# Patient Record
Sex: FEMALE | Race: WHITE | NOT HISPANIC OR LATINO | Employment: FULL TIME | URBAN - METROPOLITAN AREA
[De-identification: names, ages, dates, MRNs, and addresses within clinical notes are randomized per-mention and may not be internally consistent; named-entity substitution may affect disease eponyms.]

---

## 2020-06-04 ENCOUNTER — OFFICE VISIT (OUTPATIENT)
Dept: SURGERY | Facility: CLINIC | Age: 40
End: 2020-06-04

## 2020-06-04 VITALS — BODY MASS INDEX: 44.98 KG/M2 | HEIGHT: 65 IN | WEIGHT: 270 LBS

## 2020-06-04 DIAGNOSIS — K80.20 CALCULUS OF GALLBLADDER WITHOUT CHOLECYSTITIS WITHOUT OBSTRUCTION: Primary | ICD-10-CM

## 2020-06-04 PROCEDURE — 99203 OFFICE O/P NEW LOW 30 MIN: CPT | Performed by: SURGERY

## 2020-06-04 RX ORDER — INFLIXIMAB 100 MG/10ML
INJECTION, POWDER, LYOPHILIZED, FOR SOLUTION INTRAVENOUS
COMMUNITY
Start: 2013-08-21

## 2020-06-04 RX ORDER — PANTOPRAZOLE SODIUM 40 MG/1
1 TABLET, DELAYED RELEASE ORAL DAILY PRN
COMMUNITY
Start: 2020-06-01

## 2020-06-15 NOTE — PROGRESS NOTES
General Surgery  Initial Office Visit    CC: Abdominal pain    HPI: The patient is a pleasant 39 y.o. year-old lady who presents today for evaluation of right upper quadrant abdominal pain that has been occurring intermittently for many years and is mostly only evident with deep palpation.  At rest, the area within her right upper quadrant abdomen is nontender.  She always attributed the pain to her previous diagnosis of Crohn's disease which was first known to her around age 31.  She takes Remicade injections every 6 weeks to control her Crohn's disease.  She recently experienced some heartburn, but states that it is better since she started weight watchers and has lost some weight.  Her gastroenterologist performed a gallbladder ultrasound due to her subjective right upper quadrant tenderness on palpation and identified a multitude of gallstones, the largest of which measured 2.1 cm in diameter.  She was then referred to see me to discuss possible cholecystectomy.    Past Medical History:   Crohn's disease    Past Surgical History:   Colonoscopy (2013 [facility])    Medications:   Remicade subQ injection every 6 weeks    Allergies: No known drug allergies    Family History: Mother with history of metastatic breast cancer; no family history of gastrointestinal cancer, inflammatory bowel disease, or gallbladder pathology that she is aware of    Social History: , works as a teacher for [employer], non-smoker, no regular alcohol use    ROS:   Constitutional: Negative for fevers or chills  HENT: Negative for hearing loss or runny nose  Eyes: Negative for vision changes or scleral icterus  Respiratory: Negative for cough or shortness of breath  Cardiovascular: Negative for chest pain or heart palpitations  Gastrointestinal: Positive for diarrhea and reflux; Negative for abdominal pain, nausea, vomiting, constipation, melena, or hematochezia  Genitourinary: Negative for hematuria or  dysuria  Musculoskeletal: Negative for joint swelling or gait instability  Neurologic: Negative for tremors or seizures  Psychiatric: Negative for suicidal ideations or depression  All other systems reviewed and negative    Physical Exam:  Height: 165 cm  Weight: 122 kg  BMI: 44.9  General: No acute distress, well-nourished & well-developed  HEAD: normocephalic, atraumatic  EYES: normal conjunctiva, sclera anicteric  EARS: grossly normal hearing  NECK: supple, no thyromegaly  CARDIOVASCULAR: regular rate and rhythm  RESPIRATORY: clear to auscultation bilaterally  GASTROINTESTINAL: soft, nontender, non-distended  MUSCULOSKELETAL: normal gait and station. No gross extremity abnormalities  PSYCHIATRIC: oriented x3, normal mood and affect    IMAGING:  RIGHT UPPER QUADRANT ULTRASOUND ([facility] [place], [place] performed 6/1/2020):  IMPRESSION: Multiple large gallstones with positive sonographic Vazquez sign.  The largest gallstone measures up to 2.1 cm.  No evidence of ductal dilatation is identified.  No gallbladder wall thickening or pericholecystic fluid evident.    ASSESSMENT & PLAN  Ms. Pickard is a 39-year-old lady with symptomatic cholelithiasis, although her symptoms are fairly mild at this time.  I discussed her gallbladder ultrasound results which I reviewed personally today.  These demonstrate a multitude of gallstones but no sign of acute cholecystitis.  I have recommended she consider undergoing laparoscopic cholecystectomy with intraoperative cholangiogram.  I discussed the repercussions of gallstones including acute cholecystitis, choledocholithiasis, cholangitis, and acute gallstone pancreatitis.  For now, she would like to continue losing weight with the weight watchers program and will call me back if and when she decides to pursue surgery.  She knows to of course call sooner if she develops any signs of jaundice, scleral icterus, or severe right upper quadrant abdominal  pain.    Lianne Coyne MD  General and Endoscopic Surgery  [facility]    [address], [address]  [place], [place], [address]  P: [phone]  F: [phone]

## 2020-11-18 ENCOUNTER — TELEMEDICINE (OUTPATIENT)
Dept: FAMILY MEDICINE CLINIC | Facility: TELEHEALTH | Age: 40
End: 2020-11-18

## 2020-11-18 DIAGNOSIS — L02.429 FURUNCLE OF LOWER LEG: Primary | ICD-10-CM

## 2020-11-18 PROCEDURE — 99213 OFFICE O/P EST LOW 20 MIN: CPT | Performed by: NURSE PRACTITIONER

## 2020-11-18 RX ORDER — DOXYCYCLINE 100 MG/1
100 CAPSULE ORAL 2 TIMES DAILY
Qty: 14 CAPSULE | Refills: 0 | Status: SHIPPED | OUTPATIENT
Start: 2020-11-18 | End: 2020-11-25

## 2020-11-18 NOTE — PATIENT INSTRUCTIONS
Contact your dermatologist and schedule an appointment for follow-up as soon as they have availability.  If you develop fever, or the lesions get worse before you can see your doctor, go to urgent care or ED.      Doxycycline tablets or capsules  What is this medicine?  DOXYCYCLINE (dox mery jones) is a tetracycline antibiotic. It kills certain bacteria or stops their growth. It is used to treat many kinds of infections, like dental, skin, respiratory, and urinary tract infections. It also treats acne, Lyme disease, malaria, and certain sexually transmitted infections.  This medicine may be used for other purposes; ask your health care provider or pharmacist if you have questions.  COMMON BRAND NAME(S): Acticlate, Adoxa, Adoxa CK, Adoxa Mack, Adoxa TT, Alodox, Avidoxy, Doxal, LYMEPAK, Mondoxyne NL, Monodox, Morgidox 1x, Morgidox 1x Kit, Morgidox 2x, Morgidox 2x Kit, NutriDox, Ocudox, Okebo, TARGADOX, Vibra-Tabs, Vibramycin  What should I tell my health care provider before I take this medicine?  They need to know if you have any of these conditions:  · liver disease  · long exposure to sunlight like working outdoors  · stomach problems like colitis  · an unusual or allergic reaction to doxycycline, tetracycline antibiotics, other medicines, foods, dyes, or preservatives  · pregnant or trying to get pregnant  · breast-feeding  How should I use this medicine?  Take this medicine by mouth with a full glass of water. Follow the directions on the prescription label. It is best to take this medicine without food, but if it upsets your stomach take it with food. Take your medicine at regular intervals. Do not take your medicine more often than directed. Take all of your medicine as directed even if you think you are better. Do not skip doses or stop your medicine early.  Talk to your pediatrician regarding the use of this medicine in children. While this drug may be prescribed for selected conditions, precautions do  apply.  Overdosage: If you think you have taken too much of this medicine contact a poison control center or emergency room at once.  NOTE: This medicine is only for you. Do not share this medicine with others.  What if I miss a dose?  If you miss a dose, take it as soon as you can. If it is almost time for your next dose, take only that dose. Do not take double or extra doses.  What may interact with this medicine?  · antacids  · barbiturates  · birth control pills  · bismuth subsalicylate  · carbamazepine  · methoxyflurane  · other antibiotics  · phenytoin  · vitamins that contain iron  · warfarin  This list may not describe all possible interactions. Give your health care provider a list of all the medicines, herbs, non-prescription drugs, or dietary supplements you use. Also tell them if you smoke, drink alcohol, or use illegal drugs. Some items may interact with your medicine.  What should I watch for while using this medicine?  Tell your doctor or health care professional if your symptoms do not improve.  Do not treat diarrhea with over the counter products. Contact your doctor if you have diarrhea that lasts more than 2 days or if it is severe and watery.  Do not take this medicine just before going to bed. It may not dissolve properly when you lay down and can cause pain in your throat. Drink plenty of fluids while taking this medicine to also help reduce irritation in your throat.  This medicine can make you more sensitive to the sun. Keep out of the sun. If you cannot avoid being in the sun, wear protective clothing and use sunscreen. Do not use sun lamps or tanning beds/booths.  Birth control pills may not work properly while you are taking this medicine. Talk to your doctor about using an extra method of birth control.  If you are being treated for a sexually transmitted infection, avoid sexual contact until you have finished your treatment. Your sexual partner may also need treatment.  Avoid antacids,  aluminum, calcium, magnesium, and iron products for 4 hours before and 2 hours after taking a dose of this medicine.  If you are using this medicine to prevent malaria, you should still protect yourself from contact with mosquitos. Stay in screened-in areas, use mosquito nets, keep your body covered, and use an insect repellent.  What side effects may I notice from receiving this medicine?  Side effects that you should report to your doctor or health care professional as soon as possible:  · allergic reactions like skin rash, itching or hives, swelling of the face, lips, or tongue  · difficulty breathing  · fever  · itching in the rectal or genital area  · pain on swallowing  · rash, fever, and swollen lymph nodes  · redness, blistering, peeling or loosening of the skin, including inside the mouth  · severe stomach pain or cramps  · unusual bleeding or bruising  · unusually weak or tired  · yellowing of the eyes or skin  Side effects that usually do not require medical attention (report to your doctor or health care professional if they continue or are bothersome):  · diarrhea  · loss of appetite  · nausea, vomiting  This list may not describe all possible side effects. Call your doctor for medical advice about side effects. You may report side effects to FDA at 6-758-FDA-7684.  Where should I keep my medicine?  Keep out of the reach of children.  Store at room temperature, below 30 degrees C (86 degrees F). Protect from light. Keep container tightly closed. Throw away any unused medicine after the expiration date. Taking this medicine after the expiration date can make you seriously ill.  NOTE: This sheet is a summary. It may not cover all possible information. If you have questions about this medicine, talk to your doctor, pharmacist, or health care provider.  © 2020 Elsevier/Gold Standard (2020-03-19 13:44:53)  Skin Abscess    A skin abscess is an infected area on or under your skin that contains a collection of  pus and other material. An abscess may also be called a furuncle, carbuncle, or boil. An abscess can occur in or on almost any part of your body.  Some abscesses break open (rupture) on their own. Most continue to get worse unless they are treated. The infection can spread deeper into the body and eventually into your blood, which can make you feel ill. Treatment usually involves draining the abscess.  What are the causes?  An abscess occurs when germs, like bacteria, pass through your skin and cause an infection. This may be caused by:  · A scrape or cut on your skin.  · A puncture wound through your skin, including a needle injection or insect bite.  · Blocked oil or sweat glands.  · Blocked and infected hair follicles.  · A cyst that forms beneath your skin (sebaceous cyst) and becomes infected.  What increases the risk?  This condition is more likely to develop in people who:  · Have a weak body defense system (immune system).  · Have diabetes.  · Have dry and irritated skin.  · Get frequent injections or use illegal IV drugs.  · Have a foreign body in a wound, such as a splinter.  · Have problems with their lymph system or veins.  What are the signs or symptoms?  Symptoms of this condition include:  · A painful, firm bump under the skin.  · A bump with pus at the top. This may break through the skin and drain.  Other symptoms include:  · Redness surrounding the abscess site.  · Warmth.  · Swelling of the lymph nodes (glands) near the abscess.  · Tenderness.  · A sore on the skin.  How is this diagnosed?  This condition may be diagnosed based on:  · A physical exam.  · Your medical history.  · A sample of pus. This may be used to find out what is causing the infection.  · Blood tests.  · Imaging tests, such as an ultrasound, CT scan, or MRI.  How is this treated?  A small abscess that drains on its own may not need treatment. Treatment for larger abscesses may include:  · Moist heat or heat pack applied to the  area several times a day.  · A procedure to drain the abscess (incision and drainage).  · Antibiotic medicines. For a severe abscess, you may first get antibiotics through an IV and then change to antibiotics by mouth.  Follow these instructions at home:  Medicines    · Take over-the-counter and prescription medicines only as told by your health care provider.  · If you were prescribed an antibiotic medicine, take it as told by your health care provider. Do not stop taking the antibiotic even if you start to feel better.  Abscess care    · If you have an abscess that has not drained, apply heat to the affected area. Use the heat source that your health care provider recommends, such as a moist heat pack or a heating pad.  ? Place a towel between your skin and the heat source.  ? Leave the heat on for 20-30 minutes.  ? Remove the heat if your skin turns bright red. This is especially important if you are unable to feel pain, heat, or cold. You may have a greater risk of getting burned.  · Follow instructions from your health care provider about how to take care of your abscess. Make sure you:  ? Cover the abscess with a bandage (dressing).  ? Change your dressing or gauze as told by your health care provider.  ? Wash your hands with soap and water before you change the dressing or gauze. If soap and water are not available, use hand .  · Check your abscess every day for signs of a worsening infection. Check for:  ? More redness, swelling, or pain.  ? More fluid or blood.  ? Warmth.  ? More pus or a bad smell.  General instructions  · To avoid spreading the infection:  ? Do not share personal care items, towels, or hot tubs with others.  ? Avoid making skin contact with other people.  · Keep all follow-up visits as told by your health care provider. This is important.  Contact a health care provider if you have:  · More redness, swelling, or pain around your abscess.  · More fluid or blood coming from your  abscess.  · Warm skin around your abscess.  · More pus or a bad smell coming from your abscess.  · A fever.  · Muscle aches.  · Chills or a general ill feeling.  Get help right away if you:  · Have severe pain.  · See red streaks on your skin spreading away from the abscess.  Summary  · A skin abscess is an infected area on or under your skin that contains a collection of pus and other material.  · A small abscess that drains on its own may not need treatment.  · Treatment for larger abscesses may include having a procedure to drain the abscess and taking an antibiotic.  This information is not intended to replace advice given to you by your health care provider. Make sure you discuss any questions you have with your health care provider.  Document Released: 09/27/2006 Document Revised: 04/09/2020 Document Reviewed: 01/31/2019  [employer] Patient Education © 2020 Elsevier Inc.

## 2020-11-18 NOTE — PROGRESS NOTES
Jhonny Pickard is a 40 y.o. female.     The symptoms started over this past weekend with a spot on her left leg and then on 11/16 she noticed a second lesion. The lesions are painful to touch and she also has pain that shoots up her leg. She had a staph infection a few months ago under her left arm. She can not recall any insect bites nor has she spent time outside. She also has hidrodenitis supporativa and has some of those lesions under her arm as well. She takes remacaid injections for crohns and a rhematoid problem. Denies fever. She says she feels ok. The lesions feel warm to touch but not hot. They are not oozing. They are firm and raised.       The following portions of the patient's history were reviewed and updated as appropriate: allergies, current medications, past family history, past medical history, past social history, past surgical history and problem list.    Review of Systems   Constitutional: Positive for fatigue (baseline). Negative for chills, diaphoresis and fever.   Cardiovascular: Negative for leg swelling.   Skin: Positive for skin lesions. Negative for color change.       Objective   Physical Exam  Constitutional:       General: She is not in acute distress.     Appearance: She is well-developed. She is not diaphoretic.   Pulmonary:      Effort: Pulmonary effort is normal.   Skin:     Comments: Right upper calf lesion and mid-calf lesion, both with central white pustule with surrounding redness. Higher lesion was noticed first and is more red and larger pustule center   Neurological:      Mental Status: She is alert and oriented to person, place, and time.   Psychiatric:         Behavior: Behavior normal.           Assessment/Plan   Diagnoses and all orders for this visit:    1. Furuncle of lower leg (Primary)  -     doxycycline (MONODOX) 100 MG capsule; Take 1 capsule by mouth 2 (Two) Times a Day for 7 days.  Dispense: 14 capsule; Refill: 0        Contact your dermatologist and  schedule an appointment for follow-up as soon as they have availability.    I spent 15 minutes in the patient's chart for this video visit.

## 2021-01-21 ENCOUNTER — TELEMEDICINE (OUTPATIENT)
Dept: FAMILY MEDICINE CLINIC | Facility: TELEHEALTH | Age: 41
End: 2021-01-21

## 2021-01-21 DIAGNOSIS — H65.91 FLUID LEVEL BEHIND TYMPANIC MEMBRANE OF RIGHT EAR: ICD-10-CM

## 2021-01-21 DIAGNOSIS — R09.81 NASAL CONGESTION DUE TO PROLONGED USE OF DECONGESTANTS: Primary | ICD-10-CM

## 2021-01-21 DIAGNOSIS — T48.5X5A NASAL CONGESTION DUE TO PROLONGED USE OF DECONGESTANTS: Primary | ICD-10-CM

## 2021-01-21 PROCEDURE — 99213 OFFICE O/P EST LOW 20 MIN: CPT | Performed by: NURSE PRACTITIONER

## 2021-01-21 RX ORDER — FLUTICASONE PROPIONATE 50 MCG
SPRAY, SUSPENSION (ML) NASAL
Qty: 1 BOTTLE | Refills: 1 | Status: SHIPPED | OUTPATIENT
Start: 2021-01-21

## 2021-01-21 NOTE — PROGRESS NOTES
CHIEF COMPLAINT  Chief Complaint   Patient presents with   • Nasal Congestion         HPI  Sandra Pickard is a 40 y.o. female  presents with complaint of nasal congestion for 2 months. Now she has some right ear fullness with Popping and cracking with swallowing. She has no ear pain and no other symptoms.   She reports she has no runny nose or post-nasal drip. She has no history of allergies or allergic rhinitis. She did have a runny nose and mild cold symptoms about 2 months ago when a family member suggested she use an over the counter nasal decongestant. She has a relative that uses this 4-5 times per day for nasal congestion.   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     Review of Systems   Constitutional: Negative for chills, diaphoresis, fatigue and fever.   HENT: Positive for congestion. Negative for ear pain (fullness), postnasal drip, rhinorrhea, sinus pressure, sinus pain, sneezing and sore throat.         Reports no loss of taste, but hard to smell with nasal congestion.    Respiratory: Negative for cough, shortness of breath and wheezing.    Cardiovascular: Negative for chest pain.   Gastrointestinal: Negative for diarrhea, nausea and vomiting.   Neurological: Negative for headaches.   Hematological: Negative for adenopathy.       Past Medical History:   Diagnosis Date   • Crohn's disease (CMS/[facility])        Family History   Problem Relation Age of Onset   • Breast cancer Mother        Social History     Socioeconomic History   • Marital status:      Spouse name: Not on file   • Number of children: Not on file   • Years of education: Not on file   • Highest education level: Not on file   Tobacco Use   • Smoking status: Never Smoker   • Smokeless tobacco: Never Used   Substance and Sexual Activity   • Alcohol use: Never     Frequency: Never   • Drug use: Never   • Sexual activity: Defer         There were no vitals taken  for this visit.    PHYSICAL EXAM  Physical Exam   Constitutional: She is oriented to person, place, and time. She appears well-developed and well-nourished. She does not have a sickly appearance. She does not appear ill. No distress.   HENT:   Head: Normocephalic and atraumatic.   Nose: Mucosal edema present.   Eyes: EOM are normal.   Neck: Neck normal appearance.  Pulmonary/Chest: Effort normal.  No respiratory distress.  Neurological: She is alert and oriented to person, place, and time.   Skin: Skin is dry.   Psychiatric: She has a normal mood and affect.       No results found for this or any previous visit.    Diagnoses and all orders for this visit:    1. Nasal congestion due to prolonged use of decongestants (Primary)    2. Fluid level behind tympanic membrane of right ear    Other orders  -     fluticasone (Flonase) 50 MCG/ACT nasal spray; 1 spray in each nostril bid for 7 days then daily  Dispense: 1 bottle; Refill: 1          FOLLOW-UP  As discussed during visit with PCP/[facility] if no improvement or Urgent Care/Emergency Department if worsening of symptoms    Patient verbalizes understanding of medication dosage, comfort measures, instructions for treatment and follow-up.    JANICE Cheung  01/21/2021  16:37 EST    This visit was performed via Telehealth.  This patient has been instructed to follow-up with their primary care provider if their symptoms worsen or the treatment provided does not resolve their illness.

## 2021-01-21 NOTE — PATIENT INSTRUCTIONS
· Consider intranasal decongestants such as oxymetazoline or phenylephrine for intermittent use, but do not use continuously due to the risk of rebound rhinitis  · Stop the use of nasal decongestant  · This may take several weeks to treat.   · If nasal passages are dry try nasal saline drops or humidifier  · If nasal congestion does not respond to nasal corticosteroid, may need otolaryngology referral (ENT).   · Follow up in 2 weeks if no response to treatment.   · If you develop ear pain follow up as you may need antibiotic treatment.         Nonallergic Rhinitis  Nonallergic rhinitis is a condition that causes symptoms that affect the nose, such as a runny nose and a stuffed-up nose (nasal congestion) that can make it hard to breathe through the nose. This condition is different from having an allergy (allergic rhinitis). Allergic rhinitis occurs when the body's defense system (immune system) reacts to a substance that you are allergic to (allergen), such as pollen, pet dander, mold, or dust. Nonallergic rhinitis has many similar symptoms, but it is not caused by allergens. Nonallergic rhinitis can be a short-term or long-term problem.  What are the causes?  This condition can be caused by many different things. Some common types of nonallergic rhinitis include:  Infectious rhinitis  · This is usually due to an infection in the upper respiratory tract.  Vasomotor rhinitis  · This is the most common type of long-term nonallergic rhinitis.  · It is caused by too much blood flow through the nose, which makes the tissue inside of the nose swell.  · Symptoms are often triggered by strong odors, cold air, stress, drinking alcohol, cigarette smoke, or changes in the weather.  Occupational rhinitis  · This type is caused by triggers in the workplace, such as chemicals, dusts, animal dander, or air pollution.  Hormonal rhinitis  · This type occurs in women as a result of an increase in the female hormone estrogen.  · It  may occur during pregnancy, puberty, and menstrual cycles.  · Symptoms improve when estrogen levels drop.  Drug-induced rhinitis  Several drugs can cause nonallergic rhinitis, including:  · Medicines that are used to treat high blood pressure, heart disease, and Parkinson disease.  · Aspirin and NSAIDs.  · Over-the-counter nasal decongestant sprays. These can cause a type of nonallergic rhinitis (rhinitis medicamentosa) when they are used for more than a few days.  Nonallergic rhinitis with eosinophilia syndrome (NARES)  · This type is caused by having too much of a certain type of white blood cell (eosinophil).  Nonallergic rhinitis can also be caused by a reaction to eating hot or spicy foods. This does not usually cause long-term symptoms. In some cases, the cause of nonallergic rhinitis is not known.  What increases the risk?  You are more likely to develop this condition if:  · You are 30-60 years of age.  · You are a woman. Women are twice as likely to have this condition.  What are the signs or symptoms?  Common symptoms of this condition include:  · Nasal congestion.  · Runny nose.  · The feeling of mucus going down the back of the throat (postnasal drip).  · Trouble sleeping at night and daytime sleepiness.  Less common symptoms include:  · Sneezing.  · Coughing.  · Itchy nose.  · Bloodshot eyes.  How is this diagnosed?  This condition may be diagnosed based on:  · Your symptoms and medical history.  · A physical exam.  · Allergy testing to rule out allergic rhinitis. You may have skin tests or blood tests.  In some cases, the health care provider may take a swab of nasal secretions to look for an increased number of eosinophils. This would be done to confirm a diagnosis of NARES.  How is this treated?  Treatment for this condition depends on the cause. No single treatment works for everyone. Work with your health care provider to find the best treatment for you. Treatment may include:  · Avoiding the  things that trigger your symptoms.  · Using medicines to relieve congestion, such as:  ? Steroid nasal spray. There are many types. You may need to try a few to find out which one works best.  ? Decongestant medicine. This may be an oral medicine or a nasal spray. These medicines are only used for a short time.  · Using medicines to relieve a runny nose. These may include antihistamine medicines or anticholinergic nasal sprays.    Surgery to remove tissue from inside the nose may be needed in severe cases if the condition has not improved after 6-12 months of medical treatment.  Follow these instructions at home:  · Take or use over-the-counter and prescription medicines only as told by your health care provider. Do not stop using your medicine even if you start to feel better.  · Use salt-water (saline) rinses or other solutions (nasal washes or irrigations) to wash or rinse out the inside of your nose as told by your health care provider.  · Do not take NSAIDs or medicines that contain aspirin if they make your symptoms worse.  · Do not drink alcohol if it makes your symptoms worse.  · Do not use any tobacco products, such as cigarettes, chewing tobacco, and e-cigarettes. If you need help quitting, ask your health care provider.  · Avoid secondhand smoke.  · Get some exercise every day. Exercise may help reduce symptoms of nonallergic rhinitis for some people. Ask your health care provider how much exercise and what types of exercise are safe for you.  · Sleep with the head of your bed raised (elevated). This may reduce nighttime nasal congestion.  · Keep all follow-up visits as told by your health care provider. This is important.  Contact a health care provider if:  · You have a fever.  · Your symptoms are getting worse at home.  · Your symptoms are not responding to medicine.  · You develop new symptoms, especially a headache or nosebleed.  This information is not intended to replace advice given to you by your  health care provider. Make sure you discuss any questions you have with your health care provider.  Document Revised: 11/30/2018 Document Reviewed: 03/09/2017  [employer]vier Patient Education © 2020 [employer]

## 2021-02-09 ENCOUNTER — TELEMEDICINE (OUTPATIENT)
Dept: FAMILY MEDICINE CLINIC | Facility: TELEHEALTH | Age: 41
End: 2021-02-09

## 2021-02-09 VITALS — BODY MASS INDEX: 34.16 KG/M2 | TEMPERATURE: 97.9 F | WEIGHT: 205 LBS | HEIGHT: 65 IN

## 2021-02-09 DIAGNOSIS — H66.90 ACUTE OTITIS MEDIA, UNSPECIFIED OTITIS MEDIA TYPE: ICD-10-CM

## 2021-02-09 DIAGNOSIS — H61.21 CERUMEN DEBRIS ON TYMPANIC MEMBRANE OF RIGHT EAR: ICD-10-CM

## 2021-02-09 DIAGNOSIS — L98.9 FACIAL SKIN LESION: Primary | ICD-10-CM

## 2021-02-09 PROBLEM — N63.0 BREAST LUMP: Status: ACTIVE | Noted: 2021-02-09

## 2021-02-09 PROBLEM — D64.9 ABSOLUTE ANEMIA: Status: ACTIVE | Noted: 2021-02-09

## 2021-02-09 PROBLEM — IMO0001 BLUES: Status: ACTIVE | Noted: 2021-02-09

## 2021-02-09 PROBLEM — L40.9 PSORIASIS: Status: ACTIVE | Noted: 2021-02-09

## 2021-02-09 PROBLEM — L30.9 DERMATITIS, ECZEMATOID: Status: ACTIVE | Noted: 2021-02-09

## 2021-02-09 PROBLEM — N39.0 INFECTION OF URINARY TRACT: Status: ACTIVE | Noted: 2021-02-09

## 2021-02-09 PROBLEM — L25.9 CD (CONTACT DERMATITIS): Status: ACTIVE | Noted: 2021-02-09

## 2021-02-09 PROBLEM — K52.9 COLITIS: Status: ACTIVE | Noted: 2021-02-09

## 2021-02-09 PROBLEM — L70.9 ACNE: Status: ACTIVE | Noted: 2021-02-09

## 2021-02-09 PROBLEM — H65.00 ACUTE SEROUS OTITIS MEDIA: Status: ACTIVE | Noted: 2021-02-09

## 2021-02-09 PROBLEM — E55.9 AVITAMINOSIS D: Status: ACTIVE | Noted: 2021-02-09

## 2021-02-09 PROCEDURE — 99212 OFFICE O/P EST SF 10 MIN: CPT | Performed by: NURSE PRACTITIONER

## 2021-02-09 RX ORDER — AMOXICILLIN 875 MG/1
875 TABLET, COATED ORAL 2 TIMES DAILY
Qty: 20 TABLET | Refills: 0 | Status: SHIPPED | OUTPATIENT
Start: 2021-02-09 | End: 2021-02-19

## 2021-02-09 RX ORDER — GUAIFENESIN 600 MG/1
600 TABLET, EXTENDED RELEASE ORAL 2 TIMES DAILY
Qty: 28 TABLET | Refills: 0 | Status: SHIPPED | OUTPATIENT
Start: 2021-02-09 | End: 2021-02-23

## 2021-02-09 NOTE — PROGRESS NOTES
CHIEF COMPLAINT  Cc: ear ache    HPI  Sandra Pickard is a 40 y.o. female  presents with complaint of an ear ache on the right. It is mostly pressure but she does report intermittent daily pain in the ear too. She was seen via telehealth on 01/21/21. At that time she was having popping amd cracking in her right ear when she swallows. She was diagnosed with nasal congestion due to prolonged use of nasal decongestants and fluid behind her right TM. She was advised to stop the nasal decongestant and start flonase which she has done without relief of symptoms. She has some hearing loss on the right too  She uses q tips at times. She is also c/o of a sore on the left near her mouth that will not go away. It is cracked and crusted. She has used neosporin on it with no improvement. She does report a history of a staph infection.    Review of Systems   Constitutional: Negative for fatigue and fever.   HENT: Positive for congestion (improving,yellow in am then clear), ear pain (ear pressure and hurts intermittently on right), hearing loss (right), postnasal drip, rhinorrhea and tinnitus (ocassional, bilateral, she thinks it is from band). Negative for sinus pressure, sinus pain and sore throat.    Respiratory: Negative for cough, shortness of breath and wheezing.    Cardiovascular: Negative for chest pain.   Gastrointestinal: Positive for diarrhea (baseline from crohns). Negative for nausea and vomiting.   Musculoskeletal: Negative for myalgias.   Skin: Negative for rash.        Near corner of mouth cracks and crusty   Neurological: Positive for light-headedness (mild just today, intermittent). Negative for dizziness and headaches.       Past Medical History:   Diagnosis Date   • Crohn's disease ([facility])        Family History   Problem Relation Age of Onset   • Breast cancer Mother        Social History     Socioeconomic History   • Marital status:      Spouse name: Not on file   • Number of children: Not on file   •  "Years of education: Not on file   • Highest education level: Not on file   Tobacco Use   • Smoking status: Never Smoker   • Smokeless tobacco: Never Used   Substance and Sexual Activity   • Alcohol use: Never     Frequency: Never   • Drug use: Never   • Sexual activity: Defer         Temp 97.9 °F (36.6 °C)   Ht 165.1 cm (65\")   Wt 93 kg (205 lb)   Breastfeeding No   BMI 34.11 kg/m²     PHYSICAL EXAM  Physical Exam   Constitutional: She is oriented to person, place, and time. She appears well-developed and well-nourished.   HENT:   Head: Normocephalic and atraumatic.   Right Ear: External ear normal. There is tenderness. Decreased hearing is noted.   Left Ear: Hearing and external ear normal.   Nose: Congestion present.   Nares mildly erythematous   Eyes: Lids are normal. Right eye exhibits no discharge and no exudate. Left eye exhibits no discharge and no exudate. Right conjunctiva is not injected. Left conjunctiva is not injected.   Pulmonary/Chest: No accessory muscle usage. No tachypnea and no bradypnea.  No respiratory distress.No use of oxygen by nasal cannulaNo use of oxygen by mask noted.  Abdominal: Abdomen appears normal.   Lymphadenopathy:        Right cervical: No anterior cervical (patient directed exam) adenopathy present.       Left cervical: No anterior cervical (patient directed exam) adenopathy present.   Neurological: She is alert and oriented to person, place, and time. No cranial nerve deficit.   Skin: Lesion (lateral to corner of mouth lacerated crusting, 1cm in length) noted.   Psychiatric: She has a normal mood and affect. Her speech is normal and behavior is normal. Judgment and thought content normal.       No results found for this or any previous visit.    Diagnoses and all orders for this visit:    1. Facial skin lesion (Primary)    2. Acute otitis media, unspecified otitis media type    3. Cerumen debris on tympanic membrane of right ear    Other orders  -     guaiFENesin (Mucinex) " 600 MG 12 hr tablet; Take 1 tablet by mouth 2 (Two) Times a Day for 14 days.  Dispense: 28 tablet; Refill: 0  -     amoxicillin (AMOXIL) 875 MG tablet; Take 1 tablet by mouth 2 (Two) Times a Day for 10 days.  Dispense: 20 tablet; Refill: 0  -     mupirocin (Bactroban) 2 % ointment; Apply  topically to the appropriate area as directed 2 (Two) Times a Day.  Dispense: 22 g; Refill: 0  -     carbamide peroxide (Debrox) 6.5 % otic solution; Administer 10 drops to the right ear At Night As Needed for Ear Pain. May sub different size bottle if 22 ml unavailable  Dispense: 22 mL; Refill: 0    Probiotics for two weeks related to taking antibiotics. The pharmacist can help you with this if needed.  Mucinex with plenty of fluids especially water to thin secretions and help with congestion.    Wash corner of mouth with warm water and mild soap and them apply mupirocin twice daily.    If no improvement in ear post antibiotics put 10 drops of debrox in right ear with cotton ball for 5 nights  On night 6 flush with warm water and peroxide in bulb syring, may repeat flush    FOLLOW-UP    If symptoms worsen or persist follow up with PCP or Urgent Care where ear can be visualized    Patient verbalizes understanding of medication dosage, comfort measures, instructions for treatment and follow-up.    JANICE Méndez  02/09/2021  16:01 EST    This visit was performed via Telehealth.  This patient has been instructed to follow-up with their primary care provider if their symptoms worsen or the treatment provided does not resolve their illness.

## 2021-02-09 NOTE — PATIENT INSTRUCTIONS
Nonallergic Rhinitis  Nonallergic rhinitis is a condition that causes symptoms that affect the nose, such as a runny nose and a stuffed-up nose (nasal congestion) that can make it hard to breathe through the nose. This condition is different from having an allergy (allergic rhinitis). Allergic rhinitis occurs when the body's defense system (immune system) reacts to a substance that you are allergic to (allergen), such as pollen, pet dander, mold, or dust. Nonallergic rhinitis has many similar symptoms, but it is not caused by allergens. Nonallergic rhinitis can be a short-term or long-term problem.  What are the causes?  This condition can be caused by many different things. Some common types of nonallergic rhinitis include:  Infectious rhinitis  This is usually due to an infection in the upper respiratory tract.  Vasomotor rhinitis  This is the most common type of long-term nonallergic rhinitis.  It is caused by too much blood flow through the nose, which makes the tissue inside of the nose swell.  Symptoms are often triggered by strong odors, cold air, stress, drinking alcohol, cigarette smoke, or changes in the weather.  Occupational rhinitis  This type is caused by triggers in the workplace, such as chemicals, dusts, animal dander, or air pollution.  Hormonal rhinitis  This type occurs in women as a result of an increase in the female hormone estrogen.  It may occur during pregnancy, puberty, and menstrual cycles.  Symptoms improve when estrogen levels drop.  Drug-induced rhinitis  Several drugs can cause nonallergic rhinitis, including:  Medicines that are used to treat high blood pressure, heart disease, and Parkinson disease.  Aspirin and NSAIDs.  Over-the-counter nasal decongestant sprays. These can cause a type of nonallergic rhinitis (rhinitis medicamentosa) when they are used for more than a few days.  Nonallergic rhinitis with eosinophilia syndrome (NARES)  This type is caused by having too much of a  certain type of white blood cell (eosinophil).  Nonallergic rhinitis can also be caused by a reaction to eating hot or spicy foods. This does not usually cause long-term symptoms. In some cases, the cause of nonallergic rhinitis is not known.  What increases the risk?  You are more likely to develop this condition if:  You are 30-60 years of age.  You are a woman. Women are twice as likely to have this condition.  What are the signs or symptoms?  Common symptoms of this condition include:  Nasal congestion.  Runny nose.  The feeling of mucus going down the back of the throat (postnasal drip).  Trouble sleeping at night and daytime sleepiness.  Less common symptoms include:  Sneezing.  Coughing.  Itchy nose.  Bloodshot eyes.  How is this diagnosed?  This condition may be diagnosed based on:  Your symptoms and medical history.  A physical exam.  Allergy testing to rule out allergic rhinitis. You may have skin tests or blood tests.  In some cases, the health care provider may take a swab of nasal secretions to look for an increased number of eosinophils. This would be done to confirm a diagnosis of NARES.  How is this treated?  Treatment for this condition depends on the cause. No single treatment works for everyone. Work with your health care provider to find the best treatment for you. Treatment may include:  Avoiding the things that trigger your symptoms.  Using medicines to relieve congestion, such as:  Steroid nasal spray. There are many types. You may need to try a few to find out which one works best.  Decongestant medicine. This may be an oral medicine or a nasal spray. These medicines are only used for a short time.  Using medicines to relieve a runny nose. These may include antihistamine medicines or anticholinergic nasal sprays.    Surgery to remove tissue from inside the nose may be needed in severe cases if the condition has not improved after 6-12 months of medical treatment.  Follow these instructions at  home:  Take or use over-the-counter and prescription medicines only as told by your health care provider. Do not stop using your medicine even if you start to feel better.  Use salt-water (saline) rinses or other solutions (nasal washes or irrigations) to wash or rinse out the inside of your nose as told by your health care provider.  Do not take NSAIDs or medicines that contain aspirin if they make your symptoms worse.  Do not drink alcohol if it makes your symptoms worse.  Do not use any tobacco products, such as cigarettes, chewing tobacco, and e-cigarettes. If you need help quitting, ask your health care provider.  Avoid secondhand smoke.  Get some exercise every day. Exercise may help reduce symptoms of nonallergic rhinitis for some people. Ask your health care provider how much exercise and what types of exercise are safe for you.  Sleep with the head of your bed raised (elevated). This may reduce nighttime nasal congestion.  Keep all follow-up visits as told by your health care provider. This is important.  Contact a health care provider if:  You have a fever.  Your symptoms are getting worse at home.  Your symptoms are not responding to medicine.  You develop new symptoms, especially a headache or nosebleed.  This information is not intended to replace advice given to you by your health care provider. Make sure you discuss any questions you have with your health care provider.  Document Revised: 11/30/2018 Document Reviewed: 03/09/2017  Elsevier Patient Education © 2020 [employer]  Earwax Buildup, Adult  The ears produce a substance called earwax that helps keep bacteria out of the ear and protects the skin in the ear canal. Occasionally, earwax can build up in the ear and cause discomfort or hearing loss.  What increases the risk?  This condition is more likely to develop in people who:  Are male.  Are elderly.  Naturally produce more earwax.  Clean their ears often with cotton swabs.  Use earplugs  often.  Use in-ear headphones often.  Wear hearing aids.  Have narrow ear canals.  Have earwax that is overly thick or sticky.  Have eczema.  Are dehydrated.  Have excess hair in the ear canal.  What are the signs or symptoms?  Symptoms of this condition include:  Reduced or muffled hearing.  A feeling of fullness in the ear or feeling that the ear is plugged.  Fluid coming from the ear.  Ear pain.  Ear itch.  Ringing in the ear.  Coughing.  An obvious piece of earwax that can be seen inside the ear canal.  How is this diagnosed?  This condition may be diagnosed based on:  Your symptoms.  Your medical history.  An ear exam. During the exam, your health care provider will look into your ear with an instrument called an otoscope.  You may have tests, including a hearing test.  How is this treated?  This condition may be treated by:  Using ear drops to soften the earwax.  Having the earwax removed by a health care provider. The health care provider may:  Flush the ear with water.  Use an instrument that has a loop on the end (curette).  Use a suction device.  Surgery to remove the wax buildup. This may be done in severe cases.  Follow these instructions at home:    Take over-the-counter and prescription medicines only as told by your health care provider.  Do not put any objects, including cotton swabs, into your ear. You can clean the opening of your ear canal with a washcloth or facial tissue.  Follow instructions from your health care provider about cleaning your ears. Do not over-clean your ears.  Drink enough fluid to keep your urine clear or pale yellow. This will help to thin the earwax.  Keep all follow-up visits as told by your health care provider. If earwax builds up in your ears often or if you use hearing aids, consider seeing your health care provider for routine, preventive ear cleanings. Ask your health care provider how often you should schedule your cleanings.  If you have hearing aids, clean them  according to instructions from the  and your health care provider.  Contact a health care provider if:  You have ear pain.  You develop a fever.  You have blood, pus, or other fluid coming from your ear.  You have hearing loss.  You have ringing in your ears that does not go away.  Your symptoms do not improve with treatment.  You feel like the room is spinning (vertigo).  Summary  Earwax can build up in the ear and cause discomfort or hearing loss.  The most common symptoms of this condition include reduced or muffled hearing and a feeling of fullness in the ear or feeling that the ear is plugged.  This condition may be diagnosed based on your symptoms, your medical history, and an ear exam.  This condition may be treated by using ear drops to soften the earwax or by having the earwax removed by a health care provider.  Do not put any objects, including cotton swabs, into your ear. You can clean the opening of your ear canal with a washcloth or facial tissue.  This information is not intended to replace advice given to you by your health care provider. Make sure you discuss any questions you have with your health care provider.  Document Revised: 11/30/2018 Document Reviewed: 02/28/2018  [employer] Patient Education © 2020 Elsevier Inc.  Otitis Media, Adult    Otitis media occurs when there is inflammation and fluid in the middle ear. Your middle ear is a part of the ear that contains bones for hearing as well as air that helps send sounds to your brain.  What are the causes?  This condition is caused by a blockage in the eustachian tube. This tube drains fluid from the ear to the back of the nose (nasopharynx). A blockage in this tube can be caused by an object or by swelling (edema) in the tube. Problems that can cause a blockage include:  · A cold or other upper respiratory infection.  · Allergies.  · An irritant, such as tobacco smoke.  · Enlarged adenoids. The adenoids are areas of soft tissue  located high in the back of the throat, behind the nose and the roof of the mouth.  · A mass in the nasopharynx.  · Damage to the ear caused by pressure changes (barotrauma).  What are the signs or symptoms?  Symptoms of this condition include:  · Ear pain.  · A fever.  · Decreased hearing.  · A headache.  · Tiredness (lethargy).  · Fluid leaking from the ear.  · Ringing in the ear.  How is this diagnosed?  This condition is diagnosed with a physical exam. During the exam your health care provider will use an instrument called an otoscope to look into your ear and check for redness, swelling, and fluid. He or she will also ask about your symptoms.  Your health care provider may also order tests, such as:  · A test to check the movement of the eardrum (pneumatic otoscopy). This test is done by squeezing a small amount of air into the ear.  · A test that changes air pressure in the middle ear to check how well the eardrum moves and whether the eustachian tube is working (tympanogram).  How is this treated?  This condition usually goes away on its own within 3-5 days. But if the condition is caused by a bacteria infection and does not go away own its own, or keeps coming back, your health care provider may:  · Prescribe antibiotic medicines to treat the infection.  · Prescribe or recommend medicines to control pain.  Follow these instructions at home:  · Take over-the-counter and prescription medicines only as told by your health care provider.  · If you were prescribed an antibiotic medicine, take it as told by your health care provider. Do not stop taking the antibiotic even if you start to feel better.  · Keep all follow-up visits as told by your health care provider. This is important.  Contact a health care provider if:  · You have bleeding from your nose.  · There is a lump on your neck.  · You are not getting better in 5 days.  · You feel worse instead of better.  Get help right away if:  · You have severe pain  that is not controlled with medicine.  · You have swelling, redness, or pain around your ear.  · You have stiffness in your neck.  · A part of your face is paralyzed.  · The bone behind your ear (mastoid) is tender when you touch it.  · You develop a severe headache.  Summary  · Otitis media is redness, soreness, and swelling of the middle ear.  · This condition usually goes away on its own within 3-5 days.  · If the problem does not go away in 3-5 days, your health care provider may prescribe or recommend medicines to treat your symptoms.  · If you were prescribed an antibiotic medicine, take it as told by your health care provider.  This information is not intended to replace advice given to you by your health care provider. Make sure you discuss any questions you have with your health care provider.  Document Revised: 11/30/2018 Document Reviewed: 12/08/2017  Elsevier Patient Education © 2020 Elsevier Inc.

## 2022-11-13 ENCOUNTER — HOSPITAL ENCOUNTER (OUTPATIENT)
Facility: HOSPITAL | Age: 42
Discharge: HOME | End: 2022-11-14
Attending: EMERGENCY MEDICINE | Admitting: SURGERY

## 2022-11-13 ENCOUNTER — APPOINTMENT (OUTPATIENT)
Dept: GENERAL RADIOLOGY | Facility: HOSPITAL | Age: 42
End: 2022-11-13

## 2022-11-13 ENCOUNTER — APPOINTMENT (OUTPATIENT)
Dept: ULTRASOUND IMAGING | Facility: HOSPITAL | Age: 42
End: 2022-11-13

## 2022-11-13 ENCOUNTER — ANESTHESIA EVENT (OUTPATIENT)
Dept: PERIOP | Facility: HOSPITAL | Age: 42
End: 2022-11-13

## 2022-11-13 DIAGNOSIS — R10.10 PAIN OF UPPER ABDOMEN: ICD-10-CM

## 2022-11-13 DIAGNOSIS — R11.2 NAUSEA AND VOMITING, UNSPECIFIED VOMITING TYPE: ICD-10-CM

## 2022-11-13 DIAGNOSIS — K81.9 CHOLECYSTITIS: Primary | ICD-10-CM

## 2022-11-13 DIAGNOSIS — K81.0 ACUTE CHOLECYSTITIS: ICD-10-CM

## 2022-11-13 LAB
ALBUMIN SERPL-MCNC: 4.1 G/DL (ref 3.5–5.2)
ALBUMIN/GLOB SERPL: 1.2 G/DL
ALP SERPL-CCNC: 71 U/L (ref 39–117)
ALT SERPL W P-5'-P-CCNC: 14 U/L (ref 1–33)
ANION GAP SERPL CALCULATED.3IONS-SCNC: 8.2 MMOL/L (ref 5–15)
AST SERPL-CCNC: 17 U/L (ref 1–32)
BACTERIA UR QL AUTO: ABNORMAL /HPF
BASOPHILS # BLD AUTO: 0.02 10*3/MM3 (ref 0–0.2)
BASOPHILS NFR BLD AUTO: 0.2 % (ref 0–1.5)
BILIRUB SERPL-MCNC: 1 MG/DL (ref 0–1.2)
BILIRUB UR QL STRIP: NEGATIVE
BUN SERPL-MCNC: 12 MG/DL (ref 6–20)
BUN/CREAT SERPL: 18.5 (ref 7–25)
CALCIUM SPEC-SCNC: 9.2 MG/DL (ref 8.6–10.5)
CHLORIDE SERPL-SCNC: 106 MMOL/L (ref 98–107)
CLARITY UR: ABNORMAL
CO2 SERPL-SCNC: 26.8 MMOL/L (ref 22–29)
COLOR UR: YELLOW
CREAT SERPL-MCNC: 0.65 MG/DL (ref 0.57–1)
DEPRECATED RDW RBC AUTO: 39.8 FL (ref 37–54)
EGFRCR SERPLBLD CKD-EPI 2021: 113.6 ML/MIN/1.73
EOSINOPHIL # BLD AUTO: 0.03 10*3/MM3 (ref 0–0.4)
EOSINOPHIL NFR BLD AUTO: 0.3 % (ref 0.3–6.2)
ERYTHROCYTE [DISTWIDTH] IN BLOOD BY AUTOMATED COUNT: 12.8 % (ref 12.3–15.4)
GLOBULIN UR ELPH-MCNC: 3.5 GM/DL
GLUCOSE SERPL-MCNC: 122 MG/DL (ref 65–99)
GLUCOSE UR STRIP-MCNC: NEGATIVE MG/DL
HCT VFR BLD AUTO: 40.4 % (ref 34–46.6)
HGB BLD-MCNC: 13.4 G/DL (ref 12–15.9)
HGB UR QL STRIP.AUTO: NEGATIVE
HOLD SPECIMEN: NORMAL
HOLD SPECIMEN: NORMAL
HYALINE CASTS UR QL AUTO: ABNORMAL /LPF
IMM GRANULOCYTES # BLD AUTO: 0.04 10*3/MM3 (ref 0–0.05)
IMM GRANULOCYTES NFR BLD AUTO: 0.3 % (ref 0–0.5)
KETONES UR QL STRIP: ABNORMAL
LEUKOCYTE ESTERASE UR QL STRIP.AUTO: ABNORMAL
LIPASE SERPL-CCNC: 39 U/L (ref 13–60)
LYMPHOCYTES # BLD AUTO: 1.83 10*3/MM3 (ref 0.7–3.1)
LYMPHOCYTES NFR BLD AUTO: 15.3 % (ref 19.6–45.3)
MCH RBC QN AUTO: 28.2 PG (ref 26.6–33)
MCHC RBC AUTO-ENTMCNC: 33.2 G/DL (ref 31.5–35.7)
MCV RBC AUTO: 85.1 FL (ref 79–97)
MONOCYTES # BLD AUTO: 0.28 10*3/MM3 (ref 0.1–0.9)
MONOCYTES NFR BLD AUTO: 2.3 % (ref 5–12)
NEUTROPHILS NFR BLD AUTO: 81.6 % (ref 42.7–76)
NEUTROPHILS NFR BLD AUTO: 9.76 10*3/MM3 (ref 1.7–7)
NITRITE UR QL STRIP: NEGATIVE
NRBC BLD AUTO-RTO: 0 /100 WBC (ref 0–0.2)
PH UR STRIP.AUTO: 5.5 [PH] (ref 5–8)
PLATELET # BLD AUTO: 217 10*3/MM3 (ref 140–450)
PMV BLD AUTO: 9.7 FL (ref 6–12)
POTASSIUM SERPL-SCNC: 3.8 MMOL/L (ref 3.5–5.2)
PROT SERPL-MCNC: 7.6 G/DL (ref 6–8.5)
PROT UR QL STRIP: ABNORMAL
QT INTERVAL: 399 MS
RBC # BLD AUTO: 4.75 10*6/MM3 (ref 3.77–5.28)
RBC # UR STRIP: ABNORMAL /HPF
REF LAB TEST METHOD: ABNORMAL
SODIUM SERPL-SCNC: 141 MMOL/L (ref 136–145)
SP GR UR STRIP: >=1.03 (ref 1–1.03)
SQUAMOUS #/AREA URNS HPF: ABNORMAL /HPF
TROPONIN T SERPL-MCNC: <0.01 NG/ML (ref 0–0.03)
UROBILINOGEN UR QL STRIP: ABNORMAL
WBC # UR STRIP: ABNORMAL /HPF
WBC NRBC COR # BLD: 11.96 10*3/MM3 (ref 3.4–10.8)
WHOLE BLOOD HOLD COAG: NORMAL
WHOLE BLOOD HOLD SPECIMEN: NORMAL

## 2022-11-13 PROCEDURE — G0378 HOSPITAL OBSERVATION PER HR: HCPCS

## 2022-11-13 PROCEDURE — 84484 ASSAY OF TROPONIN QUANT: CPT

## 2022-11-13 PROCEDURE — 96361 HYDRATE IV INFUSION ADD-ON: CPT

## 2022-11-13 PROCEDURE — 25010000002 ONDANSETRON PER 1 MG: Performed by: EMERGENCY MEDICINE

## 2022-11-13 PROCEDURE — 96375 TX/PRO/DX INJ NEW DRUG ADDON: CPT

## 2022-11-13 PROCEDURE — 25010000002 PIPERACILLIN SOD-TAZOBACTAM PER 1 G: Performed by: EMERGENCY MEDICINE

## 2022-11-13 PROCEDURE — 71045 X-RAY EXAM CHEST 1 VIEW: CPT

## 2022-11-13 PROCEDURE — 81001 URINALYSIS AUTO W/SCOPE: CPT

## 2022-11-13 PROCEDURE — 96374 THER/PROPH/DIAG INJ IV PUSH: CPT

## 2022-11-13 PROCEDURE — 76705 ECHO EXAM OF ABDOMEN: CPT

## 2022-11-13 PROCEDURE — 99219 PR INITIAL OBSERVATION CARE/DAY 50 MINUTES: CPT | Performed by: SURGERY

## 2022-11-13 PROCEDURE — 80053 COMPREHEN METABOLIC PANEL: CPT

## 2022-11-13 PROCEDURE — 25010000002 PIPERACILLIN SOD-TAZOBACTAM PER 1 G: Performed by: SURGERY

## 2022-11-13 PROCEDURE — 85025 COMPLETE CBC W/AUTO DIFF WBC: CPT

## 2022-11-13 PROCEDURE — 83690 ASSAY OF LIPASE: CPT

## 2022-11-13 PROCEDURE — 99284 EMERGENCY DEPT VISIT MOD MDM: CPT

## 2022-11-13 PROCEDURE — 93010 ELECTROCARDIOGRAM REPORT: CPT | Performed by: INTERNAL MEDICINE

## 2022-11-13 PROCEDURE — 93005 ELECTROCARDIOGRAM TRACING: CPT

## 2022-11-13 RX ORDER — SODIUM CHLORIDE 0.9 % (FLUSH) 0.9 %
10 SYRINGE (ML) INJECTION EVERY 12 HOURS SCHEDULED
Status: DISCONTINUED | OUTPATIENT
Start: 2022-11-13 | End: 2022-11-14 | Stop reason: HOSPADM

## 2022-11-13 RX ORDER — HYDROCODONE BITARTRATE AND ACETAMINOPHEN 7.5; 325 MG/1; MG/1
1 TABLET ORAL EVERY 4 HOURS PRN
Status: DISCONTINUED | OUTPATIENT
Start: 2022-11-13 | End: 2022-11-14 | Stop reason: HOSPADM

## 2022-11-13 RX ORDER — MULTIVIT WITH MINERALS/LUTEIN
250 TABLET ORAL DAILY
COMMUNITY

## 2022-11-13 RX ORDER — ZINC SULFATE 50(220)MG
220 CAPSULE ORAL DAILY
COMMUNITY

## 2022-11-13 RX ORDER — CETIRIZINE HYDROCHLORIDE 5 MG/1
5 TABLET ORAL DAILY
COMMUNITY

## 2022-11-13 RX ORDER — ONDANSETRON 2 MG/ML
4 INJECTION INTRAMUSCULAR; INTRAVENOUS EVERY 6 HOURS PRN
Status: DISCONTINUED | OUTPATIENT
Start: 2022-11-13 | End: 2022-11-14 | Stop reason: HOSPADM

## 2022-11-13 RX ORDER — ACETAMINOPHEN 325 MG/1
650 TABLET ORAL EVERY 4 HOURS PRN
Status: DISCONTINUED | OUTPATIENT
Start: 2022-11-13 | End: 2022-11-14 | Stop reason: HOSPADM

## 2022-11-13 RX ORDER — SODIUM CHLORIDE 0.9 % (FLUSH) 0.9 %
10 SYRINGE (ML) INJECTION AS NEEDED
Status: DISCONTINUED | OUTPATIENT
Start: 2022-11-13 | End: 2022-11-14 | Stop reason: HOSPADM

## 2022-11-13 RX ORDER — ONDANSETRON 2 MG/ML
4 INJECTION INTRAMUSCULAR; INTRAVENOUS ONCE
Status: COMPLETED | OUTPATIENT
Start: 2022-11-13 | End: 2022-11-13

## 2022-11-13 RX ORDER — OMEPRAZOLE 20 MG/1
20 CAPSULE, DELAYED RELEASE ORAL AS NEEDED
COMMUNITY

## 2022-11-13 RX ORDER — HYDROMORPHONE HYDROCHLORIDE 1 MG/ML
0.5 INJECTION, SOLUTION INTRAMUSCULAR; INTRAVENOUS; SUBCUTANEOUS
Status: DISCONTINUED | OUTPATIENT
Start: 2022-11-13 | End: 2022-11-14 | Stop reason: HOSPADM

## 2022-11-13 RX ORDER — NALOXONE HCL 0.4 MG/ML
0.4 VIAL (ML) INJECTION
Status: DISCONTINUED | OUTPATIENT
Start: 2022-11-13 | End: 2022-11-14 | Stop reason: HOSPADM

## 2022-11-13 RX ORDER — ONDANSETRON 4 MG/1
4 TABLET, FILM COATED ORAL EVERY 6 HOURS PRN
Status: DISCONTINUED | OUTPATIENT
Start: 2022-11-13 | End: 2022-11-14 | Stop reason: HOSPADM

## 2022-11-13 RX ORDER — FAMOTIDINE 10 MG/ML
20 INJECTION, SOLUTION INTRAVENOUS ONCE
Status: COMPLETED | OUTPATIENT
Start: 2022-11-13 | End: 2022-11-13

## 2022-11-13 RX ORDER — SODIUM CHLORIDE, SODIUM LACTATE, POTASSIUM CHLORIDE, CALCIUM CHLORIDE 600; 310; 30; 20 MG/100ML; MG/100ML; MG/100ML; MG/100ML
75 INJECTION, SOLUTION INTRAVENOUS CONTINUOUS
Status: DISCONTINUED | OUTPATIENT
Start: 2022-11-13 | End: 2022-11-14 | Stop reason: HOSPADM

## 2022-11-13 RX ADMIN — FAMOTIDINE 20 MG: 10 INJECTION INTRAVENOUS at 09:59

## 2022-11-13 RX ADMIN — SODIUM CHLORIDE, POTASSIUM CHLORIDE, SODIUM LACTATE AND CALCIUM CHLORIDE 75 ML/HR: 600; 310; 30; 20 INJECTION, SOLUTION INTRAVENOUS at 13:38

## 2022-11-13 RX ADMIN — TAZOBACTAM SODIUM AND PIPERACILLIN SODIUM 3.38 G: 375; 3 INJECTION, SOLUTION INTRAVENOUS at 16:39

## 2022-11-13 RX ADMIN — ONDANSETRON 4 MG: 2 INJECTION INTRAMUSCULAR; INTRAVENOUS at 08:24

## 2022-11-13 RX ADMIN — Medication 10 ML: at 13:38

## 2022-11-13 RX ADMIN — TAZOBACTAM SODIUM AND PIPERACILLIN SODIUM 3.38 G: 375; 3 INJECTION, SOLUTION INTRAVENOUS at 11:15

## 2022-11-13 RX ADMIN — ACETAMINOPHEN 650 MG: 325 TABLET, FILM COATED ORAL at 20:59

## 2022-11-13 RX ADMIN — SODIUM CHLORIDE, POTASSIUM CHLORIDE, SODIUM LACTATE AND CALCIUM CHLORIDE 1000 ML: 600; 310; 30; 20 INJECTION, SOLUTION INTRAVENOUS at 08:24

## 2022-11-13 NOTE — ED NOTES
Nursing report ED to floor  Sandra Pickard  42 y.o.  female    HPI :   Chief Complaint   Patient presents with    Abdominal Pain       Admitting doctor:   Zari Belcher MD    Admitting diagnosis:   The primary encounter diagnosis was Cholecystitis. Diagnoses of Nausea and vomiting, unspecified vomiting type and Pain of upper abdomen were also pertinent to this visit.    Code status:   Current Code Status       Date Active Code Status Order ID Comments User Context       Not on file            Allergies:   Patient has no known allergies.    Isolation:   No active isolations    Intake and Output    Intake/Output Summary (Last 24 hours) at 11/13/2022 1124  Last data filed at 11/13/2022 0959  Gross per 24 hour   Intake 1000 ml   Output --   Net 1000 ml       Weight:       11/13/22  0638   Weight: 88.5 kg (195 lb)       Most recent vitals:   Vitals:    11/13/22 0758 11/13/22 0831 11/13/22 0931 11/13/22 1001   BP: 132/48 120/77 108/54 113/74   BP Location: Right arm      Patient Position: Sitting      Pulse: 74 69  72   Resp: 16      Temp:       TempSrc:       SpO2: 100% 96%     Weight:       Height:           Active LDAs/IV Access:   Lines, Drains & Airways       Active LDAs       Name Placement date Placement time Site Days    Peripheral IV 11/13/22 0801 Left Antecubital 11/13/22  0801  Antecubital  less than 1                    Labs (abnormal labs have a star):   Labs Reviewed   COMPREHENSIVE METABOLIC PANEL - Abnormal; Notable for the following components:       Result Value    Glucose 122 (*)     All other components within normal limits    Narrative:     GFR Normal >60  Chronic Kidney Disease <60  Kidney Failure <15     URINALYSIS W/ MICROSCOPIC IF INDICATED (NO CULTURE) - Abnormal; Notable for the following components:    Appearance, UA Cloudy (*)     Ketones, UA Trace (*)     Protein, UA 30 mg/dL (1+) (*)     Leuk Esterase, UA Trace (*)     All other components within normal limits   CBC WITH AUTO DIFFERENTIAL  - Abnormal; Notable for the following components:    WBC 11.96 (*)     Neutrophil % 81.6 (*)     Lymphocyte % 15.3 (*)     Monocyte % 2.3 (*)     Neutrophils, Absolute 9.76 (*)     All other components within normal limits   URINALYSIS, MICROSCOPIC ONLY - Abnormal; Notable for the following components:    Squamous Epithelial Cells, UA 3-6 (*)     All other components within normal limits   LIPASE - Normal   TROPONIN (IN-HOUSE) - Normal    Narrative:     Troponin T Reference Range:  <= 0.03 ng/mL-   Negative for AMI  >0.03 ng/mL-     Abnormal for myocardial necrosis.  Clinicians would have to utilize clinical acumen, EKG, Troponin and serial changes to determine if it is an Acute Myocardial Infarction or myocardial injury due to an underlying chronic condition.       Results may be falsely decreased if patient taking Biotin.     RAINBOW DRAW    Narrative:     The following orders were created for panel order [place] Draw.  Procedure                               Abnormality         Status                     ---------                               -----------         ------                     Green Top (Gel)[[id number]]                                  Final result               Lavender Top[[id number]]                                     Final result               Gold Top - SST[[id number]]                                   Final result               Light Blue Top[[id number]]                                   Final result                 Please view results for these tests on the individual orders.   CBC AND DIFFERENTIAL    Narrative:     The following orders were created for panel order CBC & Differential.  Procedure                               Abnormality         Status                     ---------                               -----------         ------                     CBC Auto Differential[[id number]]        Abnormal            Final result                 Please view results for these tests on the  individual orders.   GREEN TOP   LAVENDER TOP   GOLD TOP - SST   LIGHT BLUE TOP       EKG:   ECG 12 Lead ED Triage Standing Order; Abdominal Pain (Upper)   Final Result   HEART RATE= 65  bpm   RR Interval= 923  ms   UT Interval= 161  ms   P Horizontal Axis=   deg   P Front Axis= 8  deg   QRSD Interval= 90  ms   QT Interval= 399  ms   QRS Axis= 88  deg   T Wave Axis= 52  deg   - NORMAL ECG -   Sinus rhythm   No Prior Tracing for Comparison   Electronically Signed By: Steven Acevedo ([facility]) 13-Nov-2022 10:42:31   Date and Time of Study: 2022-11-13 08:00:34          Meds given in ED:   Medications   sodium chloride 0.9 % flush 10 mL (has no administration in time range)   lactated ringers bolus 1,000 mL (0 mL Intravenous Stopped 11/13/22 0959)   ondansetron (ZOFRAN) injection 4 mg (4 mg Intravenous Given 11/13/22 0824)   famotidine (PEPCID) injection 20 mg (20 mg Intravenous Given 11/13/22 0959)   piperacillin-tazobactam (ZOSYN) 3.375 g in iso-osmotic dextrose 50 ml (premix) (3.375 g Intravenous New Bag 11/13/22 1115)       Imaging results:  No radiology results for the last day    Ambulatory status:   Stand by assist    Social issues:   Social History     Socioeconomic History    Marital status:    Tobacco Use    Smoking status: Never    Smokeless tobacco: Never   Substance and Sexual Activity    Alcohol use: Never    Drug use: Never    Sexual activity: Kerrie Mcgill RN  11/13/22 11:24 EST

## 2022-11-13 NOTE — H&P
General Surgery H&P    CC: Right upper quadrant abdominal pain    HPI:   The patient is a very pleasant 42 y.o. female who presented to the hospital with right upper quadrant abdominal pain that began last night after eating pizza for dinner.  Reports nausea and vomiting.  Describes pain as dull today.  Denies constipation or diarrhea.    Past Medical History:  Crohn's disease, well controlled      Past Surgical History:    Colonoscopy       Medications:  Medications Prior to Admission   Medication Sig Dispense Refill Last Dose   • carbamide peroxide (Debrox) 6.5 % otic solution Administer 10 drops to the right ear At Night As Needed for Ear Pain. May sub different size bottle if 22 ml unavailable 22 mL 0    • fluticasone (Flonase) 50 MCG/ACT nasal spray 1 spray in each nostril bid for 7 days then daily 1 bottle 1    • inFLIXimab (REMICADE) 100 MG injection Infuse  into a venous catheter. Every 6 weeks      • mupirocin (Bactroban) 2 % ointment Apply  topically to the appropriate area as directed 2 (Two) Times a Day. 22 g 0    • omeprazole (priLOSEC) 20 MG capsule Take 1 capsule by mouth As Needed.      • pantoprazole (PROTONIX) 40 MG EC tablet Take 1 tablet by mouth Daily.          Allergies: No Known Allergies    Social History:   Is a   Denies smoking  Denies alcohol use      Family History:   Breast cancer-mother    Review of Systems:  Constitutional: denies any weight changes, fatigue, or weakness  Eyes: denies blurred/double vision or scleral icterus  Cardiovascular: denies chest pain, palpitations, or edemas  Respiratory: denies cough, sputum, or dyspnea  Gastrointestinal: Reports abdominal pain, nausea, vomiting  Genitourinary: denies dysuria or hematuria  Endocrine: denies cold intolerance, lethargy, or flushing  Hematologic: denies excessive bruising or bleeding  Musculoskeletal: denies weakness, joint swelling, joint pain, or stiffness  Neurologic: denies seizures, CVA,  paresthesia, or peripheral neuropathy  Skin: denies change in nevi, rashes, masses, or jaundice     All other systems reviewed and were negative.    Physical Exam:   Vitals:    11/13/22 1224   BP: 106/71   Pulse: 74   Resp: 16   Temp: 97.4 °F (36.3 °C)   SpO2: 100%     Height: 65 inches  Weight: 88 kg  BMI: 32  GENERAL: awake and alert, no acute distress, oriented to person, place, and time  HEENT: normocephalic, atraumatic, no scleral icterus, moist mucous membranes  NECK: Supple, there is no thyromegaly or lymphadenopathy  RESPIRATORY: clear to auscultation, no wheezes, rales or rhonchi, symmetric air entry  CARDIOVASCULAR: regular rate and rhythm    GASTROINTESTINAL: Soft, nondistended, slight tenderness to palpation in the right upper quadrant, Vazquez positive  MUSCULOSKELETAL: no cyanosis, clubbing, or edema   NEUROLOGIC: alert and oriented, normal speech, cranial nerves 2-12 grossly intact, no focal deficits   SKIN: Moist, warm, no rashes, no jaundice      Diagnostic work-up:     Pertinent labs:   Results from last 7 days   Lab Units 11/13/22  0800   WBC 10*3/mm3 11.96*   HEMOGLOBIN g/dL 13.4   HEMATOCRIT % 40.4   PLATELETS 10*3/mm3 217     Results from last 7 days   Lab Units 11/13/22  0800   SODIUM mmol/L 141   POTASSIUM mmol/L 3.8   CHLORIDE mmol/L 106   CO2 mmol/L 26.8   BUN mg/dL 12   CREATININE mg/dL 0.65   CALCIUM mg/dL 9.2   BILIRUBIN mg/dL 1.0   ALK PHOS U/L 71   ALT (SGPT) U/L 14   AST (SGOT) U/L 17   GLUCOSE mg/dL 122*       Imaging:  I reviewed the ultrasound images, which show a distended gallbladder with stones present and wall thickening.  Normal common bile duct.    Assessment and plan:   The patient is a 42 y.o. female with acute cholecystitis.  -Admit to observation  -Okay for diet today, n.p.o. after midnight  -Zosyn  -OR tomorrow morning for laparoscopic cholecystectomy with IOC.  The risks (bleeding, infection, injury to surrounding structures such as common bile duct, liver and  intestines), benefits and alternatives were discussed in detail with the patient.  She verbalized understanding and wishes to proceed.      Zari Belcher MD  General, Robotic, and Endoscopic Surgery  [facility]     [address], [address]  [place], [place], [address]  P: [phone]  F: [phone]

## 2022-11-13 NOTE — ED PROVIDER NOTES
EMERGENCY DEPARTMENT ENCOUNTER    CHIEF COMPLAINT  Chief Complaint: Abdominal pain  History given by: Patient  History limited by: Nothing  Room Number: P689/1  PMD: Provider, No Known  Gastroenterologist: [facility] in [place], ELIZABETH Cao    HPI:  Pt is a 42 y.o. female presents complaining of upper abdominal discomfort onset 9:30 PM with associated vomiting 10-15 times since then.  Patient reports the pain is mostly in her right upper quadrant radiating into her neck and her back.  Patient denies fever, cough, shortness of air, changes in urinary or bowel habits, swelling of extremities, sick contacts.  Patient reports she had a  in the past, last normal menstrual period 10/29/2022, no chance of pregnancy.  Patient reports she has a history of Crohn's and is on Remicade.  And reports she is aware she has gallstones.    Duration: 12 hours  Associated Symptoms: Nausea, vomiting  Aggravating Factors: Eating or drinking  Alleviating Factors: Nothing  Treatment before arrival: Nothing    PAST MEDICAL HISTORY  Active Ambulatory Problems     Diagnosis Date Noted   • Absolute anemia 2021   • Acne 2021   • Acute serous otitis media 2021   • Avitaminosis D 2021   • Blues 2021   • Breast lump 2021   • CD (contact dermatitis) 2021   • Dermatitis, eczematoid 2021   • Infection of urinary tract 2021   • Psoriasis 2021   • Colitis 2021   • Crohn's disease (HCC) 2012     Resolved Ambulatory Problems     Diagnosis Date Noted   • No Resolved Ambulatory Problems     No Additional Past Medical History       PAST SURGICAL HISTORY  Past Surgical History:   Procedure Laterality Date   •  SECTION     • COLONOSCOPY N/A     [facility]       FAMILY HISTORY  Family History   Problem Relation Age of Onset   • Breast cancer Mother        SOCIAL HISTORY  Social History     Socioeconomic History   • Marital status:     Tobacco Use   • Smoking status: Never   • Smokeless tobacco: Never   Substance and Sexual Activity   • Alcohol use: Never   • Drug use: Never   • Sexual activity: Defer       ALLERGIES  Patient has no known allergies.    REVIEW OF SYSTEMS  Review of Systems   Constitutional: Negative for chills and fever.   HENT: Positive for hearing loss. Negative for rhinorrhea and sore throat.    Eyes: Negative for visual disturbance.   Respiratory: Negative for cough and shortness of breath.    Cardiovascular: Negative for chest pain, palpitations and leg swelling.   Gastrointestinal: Positive for abdominal pain, nausea and vomiting. Negative for diarrhea.   Endocrine: Negative.    Genitourinary: Negative for decreased urine volume, dysuria and frequency.   Musculoskeletal: Negative for neck pain.   Skin: Negative for rash.   Neurological: Negative for syncope and headaches.   Psychiatric/Behavioral: Negative.    All other systems reviewed and are negative.      PHYSICAL EXAM  ED Triage Vitals   Temp Heart Rate Resp BP SpO2   11/13/22 0638 11/13/22 0638 11/13/22 0638 11/13/22 0758 11/13/22 0638   98 °F (36.7 °C) 83 18 132/48 100 %      Temp src Heart Rate Source Patient Position BP Location FiO2 (%)   11/13/22 0638 11/13/22 0758 11/13/22 0758 11/13/22 0758 --   Tympanic Monitor Sitting Right arm        Physical Exam  Vitals and nursing note reviewed.   Constitutional:       General: She is in acute distress (mild).      Appearance: She is not toxic-appearing.   HENT:      Head: Normocephalic and atraumatic.   Eyes:      Extraocular Movements: EOM normal.   Cardiovascular:      Rate and Rhythm: Normal rate and regular rhythm.      Pulses: Intact distal pulses.           Posterior tibial pulses are 2+ on the right side and 2+ on the left side.      Heart sounds: Normal heart sounds. No murmur heard.  Pulmonary:      Effort: Pulmonary effort is normal. No respiratory distress.      Breath sounds: Normal breath sounds.    Abdominal:      General: Bowel sounds are normal.      Palpations: Abdomen is soft.      Tenderness: There is abdominal tenderness in the right upper quadrant and epigastric area. There is no guarding or rebound. Negative signs include Vazquez's sign.   Musculoskeletal:         General: No edema. Normal range of motion.      Cervical back: Normal range of motion and neck supple.   Skin:     General: Skin is warm and dry.   Neurological:      Mental Status: She is alert and oriented to person, place, and time.   Psychiatric:         Mood and Affect: Affect normal.         LAB RESULTS  Lab Results (last 24 hours)     Procedure Component Value Units Date/Time    CBC & Differential [[id number]]  (Abnormal) Collected: 11/13/22 0800    Specimen: Blood Updated: 11/13/22 0816    Narrative:      The following orders were created for panel order CBC & Differential.  Procedure                               Abnormality         Status                     ---------                               -----------         ------                     CBC Auto Differential[[id number]]        Abnormal            Final result                 Please view results for these tests on the individual orders.    Comprehensive Metabolic Panel [[id number]]  (Abnormal) Collected: 11/13/22 0800    Specimen: Blood Updated: 11/13/22 0830     Glucose 122 mg/dL      BUN 12 mg/dL      Creatinine 0.65 mg/dL      Sodium 141 mmol/L      Potassium 3.8 mmol/L      Chloride 106 mmol/L      CO2 26.8 mmol/L      Calcium 9.2 mg/dL      Total Protein 7.6 g/dL      Albumin 4.10 g/dL      ALT (SGPT) 14 U/L      AST (SGOT) 17 U/L      Alkaline Phosphatase 71 U/L      Total Bilirubin 1.0 mg/dL      Globulin 3.5 gm/dL      A/G Ratio 1.2 g/dL      BUN/Creatinine Ratio 18.5     Anion Gap 8.2 mmol/L      eGFR 113.6 mL/min/1.73      Comment: National Kidney Foundation and American Society of Nephrology (ASN) Task Force recommended calculation based on the Chronic Kidney Disease  Epidemiology Collaboration (CKD-EPI) equation refit without adjustment for race.       Narrative:      GFR Normal >60  Chronic Kidney Disease <60  Kidney Failure <15      Lipase [[id number]]  (Normal) Collected: 11/13/22 0800    Specimen: Blood Updated: 11/13/22 0830     Lipase 39 U/L     Troponin [[id number]]  (Normal) Collected: 11/13/22 0800    Specimen: Blood Updated: 11/13/22 0830     Troponin T <0.010 ng/mL     Narrative:      Troponin T Reference Range:  <= 0.03 ng/mL-   Negative for AMI  >0.03 ng/mL-     Abnormal for myocardial necrosis.  Clinicians would have to utilize clinical acumen, EKG, Troponin and serial changes to determine if it is an Acute Myocardial Infarction or myocardial injury due to an underlying chronic condition.       Results may be falsely decreased if patient taking Biotin.      Urinalysis With Microscopic If Indicated (No Culture) - Urine, Clean Catch [[id number]]  (Abnormal) Collected: 11/13/22 0800    Specimen: Urine, Clean Catch Updated: 11/13/22 0822     Color, UA Yellow     Appearance, UA Cloudy     pH, UA 5.5     Specific Gravity, UA >=1.030     Glucose, UA Negative     Ketones, UA Trace     Bilirubin, UA Negative     Blood, UA Negative     Protein, UA 30 mg/dL (1+)     Leuk Esterase, UA Trace     Nitrite, UA Negative     Urobilinogen, UA 1.0 E.U./dL    CBC Auto Differential [[id number]]  (Abnormal) Collected: 11/13/22 0800    Specimen: Blood Updated: 11/13/22 0816     WBC 11.96 10*3/mm3      RBC 4.75 10*6/mm3      Hemoglobin 13.4 g/dL      Hematocrit 40.4 %      MCV 85.1 fL      MCH 28.2 pg      MCHC 33.2 g/dL      RDW 12.8 %      RDW-SD 39.8 fl      MPV 9.7 fL      Platelets 217 10*3/mm3      Neutrophil % 81.6 %      Lymphocyte % 15.3 %      Monocyte % 2.3 %      Eosinophil % 0.3 %      Basophil % 0.2 %      Immature Grans % 0.3 %      Neutrophils, Absolute 9.76 10*3/mm3      Lymphocytes, Absolute 1.83 10*3/mm3      Monocytes, Absolute 0.28 10*3/mm3      Eosinophils, Absolute 0.03  10*3/mm3      Basophils, Absolute 0.02 10*3/mm3      Immature Grans, Absolute 0.04 10*3/mm3      nRBC 0.0 /100 WBC     Urinalysis, Microscopic Only - Urine, Clean Catch [[id number]]  (Abnormal) Collected: 11/13/22 0800    Specimen: Urine, Clean Catch Updated: 11/13/22 0822     RBC, UA 0-2 /HPF      WBC, UA 0-2 /HPF      Bacteria, UA None Seen /HPF      Squamous Epithelial Cells, UA 3-6 /HPF      Hyaline Casts, UA 0-2 /LPF      Methodology Automated Microscopy          I ordered the above labs and reviewed the results    RADIOLOGY  US Gallbladder   Final Result      XR Chest 1 View   Final Result      Ultrasound gallbladder shows gallbladder wall thickening, 2 gallstones, CBD within normal limits no pericholecystic fluid    I ordered the above noted radiological studies. Interpreted by radiologist. Viewed by me in PACS.       PROCEDURES  Procedures      PROGRESS AND CONSULTS  ED Course as of 11/13/22 1720   Sun Nov 13, 2022   0947 Discussed with ultrasound tech reports patient has 2 gallstones, gallbladder wall thickening, no pericholecystic fluid, no common bile duct dilation, tender in the area with ultrasound evaluation [TO]   1051 Discussed with Dr. Zari De Santiago on for surgery who is aware of patient's presentation, exam, imaging results and agrees to admit with plan for care of patient's cholecystitis [TO]      ED Course User Index  [TO] Sandra Regalado MD     EKG          EKG time: 0800  Rhythm/Rate: Sinus rhythm, rate in the 60s  P waves and GA: Normal P waves, normal CLAIRE's  QRS, axis: Unremarkable  ST and T waves: Unremarkable    Interpreted Contemporaneously by me, independently viewed  No old for comparison    Discussed with patient regarding her CT results, plan for admission by Dr. De Santiago for definitive treatment    MEDICAL DECISION MAKING  Results were reviewed/discussed with the patient and they were also made aware of online access. Pt also made aware that some labs, such as cultures, will not be resulted  during ER visit and followup with PMD is necessary.       MDM       DIAGNOSIS  Final diagnoses:   Cholecystitis   Nausea and vomiting, unspecified vomiting type   Pain of upper abdomen       DISPOSITION  ADMISSION    Discussed treatment plan and reason for admission with pt/family and admitting physician.  Pt/family voiced understanding of the plan for admission for further testing/treatment as needed.         Latest Documented Vital Signs:  As of 17:20 EST  BP- 106/71 HR- 74 Temp- 97.4 °F (36.3 °C) (Oral) O2 sat- 100%    --  Patient was wearing facemask when I entered the room and throughout our encounter. Full protective equipment was worn throughout this patient encounter including a face mask, eye protection and gloves. Hand hygiene was performed before donning protective equipment and after removal when leaving the room.      Sandra Regalado MD  11/13/22 0315

## 2022-11-13 NOTE — PLAN OF CARE
Goal Outcome Evaluation:      VSS. No c/o abd pain at present. Plan for lap cholecystectomy tomorrow. Consent signed.

## 2022-11-13 NOTE — ED NOTES
"Patient to ED via PV c/o RUQ pain since 2130. Patient states she has been throwing up \"all night long\". Patient has history of gallstones.   "

## 2022-11-14 ENCOUNTER — ANESTHESIA (OUTPATIENT)
Dept: PERIOP | Facility: HOSPITAL | Age: 42
End: 2022-11-14

## 2022-11-14 ENCOUNTER — APPOINTMENT (OUTPATIENT)
Dept: GENERAL RADIOLOGY | Facility: HOSPITAL | Age: 42
End: 2022-11-14

## 2022-11-14 VITALS
OXYGEN SATURATION: 98 % | DIASTOLIC BLOOD PRESSURE: 66 MMHG | TEMPERATURE: 98.5 F | RESPIRATION RATE: 16 BRPM | SYSTOLIC BLOOD PRESSURE: 111 MMHG | HEIGHT: 65 IN | HEART RATE: 65 BPM | WEIGHT: 195 LBS | BODY MASS INDEX: 32.49 KG/M2

## 2022-11-14 LAB
ALBUMIN SERPL-MCNC: 3.2 G/DL (ref 3.5–5.2)
ALBUMIN/GLOB SERPL: 1.1 G/DL
ALP SERPL-CCNC: 55 U/L (ref 39–117)
ALT SERPL W P-5'-P-CCNC: 11 U/L (ref 1–33)
ANION GAP SERPL CALCULATED.3IONS-SCNC: 6 MMOL/L (ref 5–15)
AST SERPL-CCNC: 14 U/L (ref 1–32)
B-HCG UR QL: NEGATIVE
BILIRUB SERPL-MCNC: 1.2 MG/DL (ref 0–1.2)
BUN SERPL-MCNC: 8 MG/DL (ref 6–20)
BUN/CREAT SERPL: 11.9 (ref 7–25)
CALCIUM SPEC-SCNC: 8.4 MG/DL (ref 8.6–10.5)
CHLORIDE SERPL-SCNC: 107 MMOL/L (ref 98–107)
CO2 SERPL-SCNC: 27 MMOL/L (ref 22–29)
CREAT SERPL-MCNC: 0.67 MG/DL (ref 0.57–1)
DEPRECATED RDW RBC AUTO: 40.7 FL (ref 37–54)
EGFRCR SERPLBLD CKD-EPI 2021: 112.8 ML/MIN/1.73
ERYTHROCYTE [DISTWIDTH] IN BLOOD BY AUTOMATED COUNT: 13 % (ref 12.3–15.4)
GLOBULIN UR ELPH-MCNC: 2.8 GM/DL
GLUCOSE SERPL-MCNC: 94 MG/DL (ref 65–99)
HCT VFR BLD AUTO: 32.9 % (ref 34–46.6)
HGB BLD-MCNC: 11 G/DL (ref 12–15.9)
MAGNESIUM SERPL-MCNC: 1.9 MG/DL (ref 1.6–2.6)
MCH RBC QN AUTO: 28.9 PG (ref 26.6–33)
MCHC RBC AUTO-ENTMCNC: 33.4 G/DL (ref 31.5–35.7)
MCV RBC AUTO: 86.6 FL (ref 79–97)
PLATELET # BLD AUTO: 173 10*3/MM3 (ref 140–450)
PMV BLD AUTO: 9.8 FL (ref 6–12)
POTASSIUM SERPL-SCNC: 3.6 MMOL/L (ref 3.5–5.2)
PROT SERPL-MCNC: 6 G/DL (ref 6–8.5)
RBC # BLD AUTO: 3.8 10*6/MM3 (ref 3.77–5.28)
SODIUM SERPL-SCNC: 140 MMOL/L (ref 136–145)
WBC NRBC COR # BLD: 7.45 10*3/MM3 (ref 3.4–10.8)

## 2022-11-14 PROCEDURE — 80053 COMPREHEN METABOLIC PANEL: CPT | Performed by: SURGERY

## 2022-11-14 PROCEDURE — 83735 ASSAY OF MAGNESIUM: CPT | Performed by: SURGERY

## 2022-11-14 PROCEDURE — 74300 X-RAY BILE DUCTS/PANCREAS: CPT

## 2022-11-14 PROCEDURE — 88304 TISSUE EXAM BY PATHOLOGIST: CPT | Performed by: SURGERY

## 2022-11-14 PROCEDURE — G0378 HOSPITAL OBSERVATION PER HR: HCPCS

## 2022-11-14 PROCEDURE — 25010000002 DROPERIDOL PER 5 MG: Performed by: ANESTHESIOLOGY

## 2022-11-14 PROCEDURE — 25010000002 FENTANYL CITRATE (PF) 50 MCG/ML SOLUTION: Performed by: ANESTHESIOLOGY

## 2022-11-14 PROCEDURE — 25010000002 CEFAZOLIN IN DEXTROSE 2-4 GM/100ML-% SOLUTION: Performed by: SURGERY

## 2022-11-14 PROCEDURE — 99024 POSTOP FOLLOW-UP VISIT: CPT | Performed by: SURGERY

## 2022-11-14 PROCEDURE — 25010000002 DEXAMETHASONE PER 1 MG: Performed by: ANESTHESIOLOGY

## 2022-11-14 PROCEDURE — 25010000002 HYDROMORPHONE PER 4 MG: Performed by: ANESTHESIOLOGY

## 2022-11-14 PROCEDURE — 47563 LAPARO CHOLECYSTECTOMY/GRAPH: CPT | Performed by: SURGERY

## 2022-11-14 PROCEDURE — 25010000002 ONDANSETRON PER 1 MG: Performed by: ANESTHESIOLOGY

## 2022-11-14 PROCEDURE — 25010000002 PROPOFOL 10 MG/ML EMULSION: Performed by: ANESTHESIOLOGY

## 2022-11-14 PROCEDURE — 25010000002 IOPAMIDOL 61 % SOLUTION: Performed by: SURGERY

## 2022-11-14 PROCEDURE — 81025 URINE PREGNANCY TEST: CPT | Performed by: SURGERY

## 2022-11-14 PROCEDURE — 47563 LAPARO CHOLECYSTECTOMY/GRAPH: CPT | Performed by: PHYSICIAN ASSISTANT

## 2022-11-14 PROCEDURE — 25010000002 MIDAZOLAM PER 1 MG: Performed by: ANESTHESIOLOGY

## 2022-11-14 PROCEDURE — 85027 COMPLETE CBC AUTOMATED: CPT | Performed by: SURGERY

## 2022-11-14 PROCEDURE — 25010000002 PIPERACILLIN SOD-TAZOBACTAM PER 1 G: Performed by: SURGERY

## 2022-11-14 DEVICE — HORIZON TI ML 6 CLIPS/CART
Type: IMPLANTABLE DEVICE | Site: ABDOMEN | Status: FUNCTIONAL
Brand: WECK

## 2022-11-14 RX ORDER — FLUMAZENIL 0.1 MG/ML
0.2 INJECTION INTRAVENOUS AS NEEDED
Status: DISCONTINUED | OUTPATIENT
Start: 2022-11-14 | End: 2022-11-14 | Stop reason: HOSPADM

## 2022-11-14 RX ORDER — FAMOTIDINE 10 MG/ML
20 INJECTION, SOLUTION INTRAVENOUS ONCE
Status: COMPLETED | OUTPATIENT
Start: 2022-11-14 | End: 2022-11-14

## 2022-11-14 RX ORDER — ROCURONIUM BROMIDE 10 MG/ML
INJECTION, SOLUTION INTRAVENOUS AS NEEDED
Status: DISCONTINUED | OUTPATIENT
Start: 2022-11-14 | End: 2022-11-14 | Stop reason: SURG

## 2022-11-14 RX ORDER — NALOXONE HCL 0.4 MG/ML
0.2 VIAL (ML) INJECTION AS NEEDED
Status: DISCONTINUED | OUTPATIENT
Start: 2022-11-14 | End: 2022-11-14 | Stop reason: HOSPADM

## 2022-11-14 RX ORDER — ONDANSETRON 2 MG/ML
4 INJECTION INTRAMUSCULAR; INTRAVENOUS ONCE AS NEEDED
Status: COMPLETED | OUTPATIENT
Start: 2022-11-14 | End: 2022-11-14

## 2022-11-14 RX ORDER — PROMETHAZINE HYDROCHLORIDE 25 MG/1
25 SUPPOSITORY RECTAL ONCE AS NEEDED
Status: DISCONTINUED | OUTPATIENT
Start: 2022-11-14 | End: 2022-11-14 | Stop reason: HOSPADM

## 2022-11-14 RX ORDER — LABETALOL HYDROCHLORIDE 5 MG/ML
5 INJECTION, SOLUTION INTRAVENOUS
Status: DISCONTINUED | OUTPATIENT
Start: 2022-11-14 | End: 2022-11-14 | Stop reason: HOSPADM

## 2022-11-14 RX ORDER — EPHEDRINE SULFATE 50 MG/ML
5 INJECTION, SOLUTION INTRAVENOUS ONCE AS NEEDED
Status: DISCONTINUED | OUTPATIENT
Start: 2022-11-14 | End: 2022-11-14 | Stop reason: HOSPADM

## 2022-11-14 RX ORDER — MAGNESIUM HYDROXIDE 1200 MG/15ML
LIQUID ORAL AS NEEDED
Status: DISCONTINUED | OUTPATIENT
Start: 2022-11-14 | End: 2022-11-14 | Stop reason: HOSPADM

## 2022-11-14 RX ORDER — SODIUM CHLORIDE 0.9 % (FLUSH) 0.9 %
3 SYRINGE (ML) INJECTION EVERY 12 HOURS SCHEDULED
Status: DISCONTINUED | OUTPATIENT
Start: 2022-11-14 | End: 2022-11-14 | Stop reason: HOSPADM

## 2022-11-14 RX ORDER — PROMETHAZINE HYDROCHLORIDE 25 MG/1
25 TABLET ORAL ONCE AS NEEDED
Status: DISCONTINUED | OUTPATIENT
Start: 2022-11-14 | End: 2022-11-14 | Stop reason: HOSPADM

## 2022-11-14 RX ORDER — SODIUM CHLORIDE, SODIUM LACTATE, POTASSIUM CHLORIDE, CALCIUM CHLORIDE 600; 310; 30; 20 MG/100ML; MG/100ML; MG/100ML; MG/100ML
INJECTION, SOLUTION INTRAVENOUS CONTINUOUS PRN
Status: DISCONTINUED | OUTPATIENT
Start: 2022-11-14 | End: 2022-11-14 | Stop reason: SURG

## 2022-11-14 RX ORDER — CEFAZOLIN SODIUM 2 G/100ML
2 INJECTION, SOLUTION INTRAVENOUS ONCE
Status: DISCONTINUED | OUTPATIENT
Start: 2022-11-14 | End: 2022-11-14

## 2022-11-14 RX ORDER — HYDROCODONE BITARTRATE AND ACETAMINOPHEN 5; 325 MG/1; MG/1
1 TABLET ORAL EVERY 6 HOURS PRN
Qty: 15 TABLET | Refills: 0 | Status: SHIPPED | OUTPATIENT
Start: 2022-11-14 | End: 2022-11-29

## 2022-11-14 RX ORDER — OXYCODONE AND ACETAMINOPHEN 7.5; 325 MG/1; MG/1
1 TABLET ORAL EVERY 4 HOURS PRN
Status: DISCONTINUED | OUTPATIENT
Start: 2022-11-14 | End: 2022-11-14 | Stop reason: HOSPADM

## 2022-11-14 RX ORDER — EPHEDRINE SULFATE 50 MG/ML
INJECTION, SOLUTION INTRAVENOUS AS NEEDED
Status: DISCONTINUED | OUTPATIENT
Start: 2022-11-14 | End: 2022-11-14 | Stop reason: SURG

## 2022-11-14 RX ORDER — PROPOFOL 10 MG/ML
VIAL (ML) INTRAVENOUS AS NEEDED
Status: DISCONTINUED | OUTPATIENT
Start: 2022-11-14 | End: 2022-11-14 | Stop reason: SURG

## 2022-11-14 RX ORDER — ACETAMINOPHEN 500 MG
1000 TABLET ORAL ONCE
Status: COMPLETED | OUTPATIENT
Start: 2022-11-14 | End: 2022-11-14

## 2022-11-14 RX ORDER — DROPERIDOL 2.5 MG/ML
0.62 INJECTION, SOLUTION INTRAMUSCULAR; INTRAVENOUS
Status: DISCONTINUED | OUTPATIENT
Start: 2022-11-14 | End: 2022-11-14 | Stop reason: HOSPADM

## 2022-11-14 RX ORDER — DEXAMETHASONE SODIUM PHOSPHATE 10 MG/ML
INJECTION INTRAMUSCULAR; INTRAVENOUS AS NEEDED
Status: DISCONTINUED | OUTPATIENT
Start: 2022-11-14 | End: 2022-11-14 | Stop reason: SURG

## 2022-11-14 RX ORDER — MIDAZOLAM HYDROCHLORIDE 1 MG/ML
1 INJECTION INTRAMUSCULAR; INTRAVENOUS
Status: DISCONTINUED | OUTPATIENT
Start: 2022-11-14 | End: 2022-11-14 | Stop reason: HOSPADM

## 2022-11-14 RX ORDER — BUPIVACAINE HYDROCHLORIDE AND EPINEPHRINE 5; 5 MG/ML; UG/ML
INJECTION, SOLUTION EPIDURAL; INTRACAUDAL; PERINEURAL AS NEEDED
Status: DISCONTINUED | OUTPATIENT
Start: 2022-11-14 | End: 2022-11-14 | Stop reason: HOSPADM

## 2022-11-14 RX ORDER — HYDROMORPHONE HYDROCHLORIDE 1 MG/ML
0.5 INJECTION, SOLUTION INTRAMUSCULAR; INTRAVENOUS; SUBCUTANEOUS
Status: DISCONTINUED | OUTPATIENT
Start: 2022-11-14 | End: 2022-11-14 | Stop reason: HOSPADM

## 2022-11-14 RX ORDER — SODIUM CHLORIDE, SODIUM LACTATE, POTASSIUM CHLORIDE, CALCIUM CHLORIDE 600; 310; 30; 20 MG/100ML; MG/100ML; MG/100ML; MG/100ML
9 INJECTION, SOLUTION INTRAVENOUS CONTINUOUS
Status: DISCONTINUED | OUTPATIENT
Start: 2022-11-14 | End: 2022-11-14 | Stop reason: HOSPADM

## 2022-11-14 RX ORDER — HYDROCODONE BITARTRATE AND ACETAMINOPHEN 7.5; 325 MG/1; MG/1
1 TABLET ORAL ONCE AS NEEDED
Status: COMPLETED | OUTPATIENT
Start: 2022-11-14 | End: 2022-11-14

## 2022-11-14 RX ORDER — FENTANYL CITRATE 50 UG/ML
50 INJECTION, SOLUTION INTRAMUSCULAR; INTRAVENOUS
Status: DISCONTINUED | OUTPATIENT
Start: 2022-11-14 | End: 2022-11-14 | Stop reason: HOSPADM

## 2022-11-14 RX ORDER — DIPHENHYDRAMINE HCL 25 MG
25 CAPSULE ORAL
Status: DISCONTINUED | OUTPATIENT
Start: 2022-11-14 | End: 2022-11-14 | Stop reason: HOSPADM

## 2022-11-14 RX ORDER — GLYCOPYRROLATE 0.2 MG/ML
INJECTION INTRAMUSCULAR; INTRAVENOUS AS NEEDED
Status: DISCONTINUED | OUTPATIENT
Start: 2022-11-14 | End: 2022-11-14 | Stop reason: SURG

## 2022-11-14 RX ORDER — HYDRALAZINE HYDROCHLORIDE 20 MG/ML
5 INJECTION INTRAMUSCULAR; INTRAVENOUS
Status: DISCONTINUED | OUTPATIENT
Start: 2022-11-14 | End: 2022-11-14 | Stop reason: HOSPADM

## 2022-11-14 RX ORDER — DIPHENHYDRAMINE HYDROCHLORIDE 50 MG/ML
12.5 INJECTION INTRAMUSCULAR; INTRAVENOUS
Status: DISCONTINUED | OUTPATIENT
Start: 2022-11-14 | End: 2022-11-14 | Stop reason: HOSPADM

## 2022-11-14 RX ORDER — CEFAZOLIN SODIUM 2 G/100ML
2 INJECTION, SOLUTION INTRAVENOUS ONCE
Status: COMPLETED | OUTPATIENT
Start: 2022-11-14 | End: 2022-11-14

## 2022-11-14 RX ORDER — ONDANSETRON 2 MG/ML
INJECTION INTRAMUSCULAR; INTRAVENOUS AS NEEDED
Status: DISCONTINUED | OUTPATIENT
Start: 2022-11-14 | End: 2022-11-14 | Stop reason: SURG

## 2022-11-14 RX ORDER — SODIUM CHLORIDE 0.9 % (FLUSH) 0.9 %
3-10 SYRINGE (ML) INJECTION AS NEEDED
Status: DISCONTINUED | OUTPATIENT
Start: 2022-11-14 | End: 2022-11-14 | Stop reason: HOSPADM

## 2022-11-14 RX ORDER — LIDOCAINE HYDROCHLORIDE 20 MG/ML
INJECTION, SOLUTION INFILTRATION; PERINEURAL AS NEEDED
Status: DISCONTINUED | OUTPATIENT
Start: 2022-11-14 | End: 2022-11-14 | Stop reason: SURG

## 2022-11-14 RX ORDER — LIDOCAINE HYDROCHLORIDE 10 MG/ML
0.5 INJECTION, SOLUTION EPIDURAL; INFILTRATION; INTRACAUDAL; PERINEURAL ONCE AS NEEDED
Status: DISCONTINUED | OUTPATIENT
Start: 2022-11-14 | End: 2022-11-14 | Stop reason: HOSPADM

## 2022-11-14 RX ORDER — FENTANYL CITRATE 50 UG/ML
INJECTION, SOLUTION INTRAMUSCULAR; INTRAVENOUS AS NEEDED
Status: DISCONTINUED | OUTPATIENT
Start: 2022-11-14 | End: 2022-11-14 | Stop reason: SURG

## 2022-11-14 RX ORDER — ONDANSETRON 4 MG/1
4 TABLET, FILM COATED ORAL EVERY 8 HOURS PRN
Qty: 21 TABLET | Refills: 0 | Status: SHIPPED | OUTPATIENT
Start: 2022-11-14 | End: 2022-11-29

## 2022-11-14 RX ADMIN — FENTANYL CITRATE 50 MCG: 50 INJECTION INTRAMUSCULAR; INTRAVENOUS at 09:45

## 2022-11-14 RX ADMIN — ROCURONIUM BROMIDE 50 MG: 10 INJECTION, SOLUTION INTRAVENOUS at 08:05

## 2022-11-14 RX ADMIN — ACETAMINOPHEN 1000 MG: 500 TABLET ORAL at 07:18

## 2022-11-14 RX ADMIN — GLYCOPYRROLATE 0.2 MG: 0.2 INJECTION INTRAMUSCULAR; INTRAVENOUS at 09:02

## 2022-11-14 RX ADMIN — FAMOTIDINE 20 MG: 10 INJECTION INTRAVENOUS at 07:19

## 2022-11-14 RX ADMIN — CEFAZOLIN SODIUM 2 G: 2 INJECTION, SOLUTION INTRAVENOUS at 07:43

## 2022-11-14 RX ADMIN — SUGAMMADEX 200 MG: 100 INJECTION, SOLUTION INTRAVENOUS at 09:17

## 2022-11-14 RX ADMIN — ONDANSETRON 4 MG: 2 INJECTION INTRAMUSCULAR; INTRAVENOUS at 08:13

## 2022-11-14 RX ADMIN — DROPERIDOL 0.62 MG: 2.5 INJECTION, SOLUTION INTRAMUSCULAR; INTRAVENOUS at 12:05

## 2022-11-14 RX ADMIN — EPHEDRINE SULFATE 10 MG: 50 INJECTION INTRAVENOUS at 09:00

## 2022-11-14 RX ADMIN — FENTANYL CITRATE 50 MCG: 50 INJECTION INTRAMUSCULAR; INTRAVENOUS at 08:06

## 2022-11-14 RX ADMIN — SODIUM CHLORIDE, POTASSIUM CHLORIDE, SODIUM LACTATE AND CALCIUM CHLORIDE 75 ML/HR: 600; 310; 30; 20 INJECTION, SOLUTION INTRAVENOUS at 00:21

## 2022-11-14 RX ADMIN — SODIUM CHLORIDE, POTASSIUM CHLORIDE, SODIUM LACTATE AND CALCIUM CHLORIDE 9 ML/HR: 600; 310; 30; 20 INJECTION, SOLUTION INTRAVENOUS at 07:19

## 2022-11-14 RX ADMIN — HYDROMORPHONE HYDROCHLORIDE 0.5 MG: 1 INJECTION, SOLUTION INTRAMUSCULAR; INTRAVENOUS; SUBCUTANEOUS at 10:37

## 2022-11-14 RX ADMIN — SODIUM CHLORIDE, POTASSIUM CHLORIDE, SODIUM LACTATE AND CALCIUM CHLORIDE: 600; 310; 30; 20 INJECTION, SOLUTION INTRAVENOUS at 08:03

## 2022-11-14 RX ADMIN — ONDANSETRON 4 MG: 2 INJECTION INTRAMUSCULAR; INTRAVENOUS at 09:38

## 2022-11-14 RX ADMIN — MIDAZOLAM 1 MG: 1 INJECTION INTRAMUSCULAR; INTRAVENOUS at 07:34

## 2022-11-14 RX ADMIN — TAZOBACTAM SODIUM AND PIPERACILLIN SODIUM 3.38 G: 375; 3 INJECTION, SOLUTION INTRAVENOUS at 00:13

## 2022-11-14 RX ADMIN — DEXAMETHASONE SODIUM PHOSPHATE 8 MG: 10 INJECTION INTRAMUSCULAR; INTRAVENOUS at 08:13

## 2022-11-14 RX ADMIN — FENTANYL CITRATE 50 MCG: 50 INJECTION INTRAMUSCULAR; INTRAVENOUS at 08:40

## 2022-11-14 RX ADMIN — HYDROMORPHONE HYDROCHLORIDE 0.5 MG: 1 INJECTION, SOLUTION INTRAMUSCULAR; INTRAVENOUS; SUBCUTANEOUS at 10:07

## 2022-11-14 RX ADMIN — HYDROCODONE BITARTRATE AND ACETAMINOPHEN 1 TABLET: 7.5; 325 TABLET ORAL at 10:06

## 2022-11-14 RX ADMIN — PROPOFOL 160 MG: 10 INJECTION, EMULSION INTRAVENOUS at 08:04

## 2022-11-14 RX ADMIN — LIDOCAINE HYDROCHLORIDE 60 MG: 20 INJECTION, SOLUTION INFILTRATION; PERINEURAL at 08:04

## 2022-11-14 NOTE — ANESTHESIA PREPROCEDURE EVALUATION
Anesthesia Evaluation     NPO Solid Status: > 8 hours             Airway   Mallampati: II  TM distance: >3 FB  Neck ROM: full  Small opening  Dental - normal exam     Pulmonary - normal exam   Cardiovascular - normal exam    (-) hypertension, past MI      Neuro/Psych  GI/Hepatic/Renal/Endo      ROS Comment: Crohn's disease    Musculoskeletal     Abdominal    Substance History      OB/GYN          Other                        Anesthesia Plan    ASA 2     general     (I have reviewed the patient's history with the patient and the chart, including all pertinent laboratory results and imaging. I have explained the risks of anesthesia including but not limited to dental damage, sore throat, nausea, corneal abrasion, nerve injury, MI, stroke, and death.)  intravenous induction     Anesthetic plan, risks, benefits, and alternatives have been provided, discussed and informed consent has been obtained with: patient.        CODE STATUS:

## 2022-11-14 NOTE — ANESTHESIA POSTPROCEDURE EVALUATION
Patient: Sandra Pickard    Procedure Summary     Date: 11/14/22 Room / Location: [facility] OR  / [facility] MAIN OR    Anesthesia Start: 0751 Anesthesia Stop: 0930    Procedure: CHOLECYSTECTOMY LAPAROSCOPIC INTRAOPERATIVE CHOLANGIOGRAM (Abdomen) Diagnosis:     Surgeons: Zari Belcher MD Provider: Anup Johnson MD    Anesthesia Type: general ASA Status: 2          Anesthesia Type: general    Vitals  Vitals Value Taken Time   /73 11/14/22 1151   Temp 36.9 °C (98.5 °F) 11/14/22 0928   Pulse 63 11/14/22 1152   Resp 16 11/14/22 1150   SpO2 96 % 11/14/22 1152   Vitals shown include unvalidated device data.        Post Anesthesia Care and Evaluation    Patient location during evaluation: bedside  Pain management: adequate    Airway patency: patent  Anesthetic complications: No anesthetic complications    Cardiovascular status: acceptable  Respiratory status: acceptable  Hydration status: acceptable

## 2022-11-14 NOTE — DISCHARGE INSTRUCTIONS
Dr. Zari Belcher  [address]  [place] [address]  [phone]    Discharge Instructions: Gall Bladder Surgery    Go home, rest and take it easy today; however, you should get up and move about several times today to reduce the risk of developing a blood clot in your legs.   You may experience some dizziness or memory loss from the anesthesia. This may last for the next 24 hours. Someone should plan on staying with you for the first 24 hours for your safety.   Do not make any important legal decisions or sign any legal papers for the next 24 hours.   Eat and drink lightly today. Start off with liquids, jello, soup, crackers or other bland foods at first. You may advance your diet tomorrow as tolerated as long as you do not experience any nausea or vomiting.   Your incisions are covered with skin glue. They will fall off on their own in 1-2 weeks. Do not worry if they come off sooner.   You may notice some bleeding/drainage on your outer dressings. A little bloody drainage is normal. If the bleeding/drainage is such that the bandage cannot absorb it, remove the dressing, apply clean gauze and apply firm pressure for a full 15 minutes. If the bleeding continues, please call me.   You may shower 24hr after surgery. No tub baths until your incisions are completely healed.   You have received a prescription for a narcotic pain medicine, as you will have some pain following surgery.  You will not be totally pain free, but your pain medicine should make the pain tolerable. Please take your pain medicine as prescribed and always take your pills with food to prevent nausea. If you are having severe pain that cannot be controlled by the pain medicine, please contact me.   Narcotic pain medicine can cause constipation. Take an over the counter stool softener, like Miaralax or docusate to prevent constipation while taking narcotics.  You have also received a prescription for an anti-nausea medicine. Please  take this as prescribed for any nausea or vomiting. Nausea could be a result of the anesthesia or a result of the narcotic pain medicine. If you experience severe nausea and vomiting that cannot be controlled by the nausea medicine, please call me.   It is not unusual to experience pain/discomfort in your shoulders or your ribs after surgery. It is from the gas used during the laparoscopic procedure and usually lasts 1-3 days. The prescription pain medicine is used to treat the surgical pain and does not typically alleviate this “gassy” pain.   No driving for 24 hours and for as long as you are taking your prescription pain medicine.   You will need to call the office at [phone] to schedule a follow-up appointment in 2 weeks.   Remember to contact me for any of the following:   Fever > 101 degrees  Severe pain that cannot be controlled by taking your pain pills  Severe nausea or vomiting that cannot be controlled by taking your nausea pills  Significant bleeding of your incisions  Drainage that has a bad smell or is yellow or green in appearance  Any other questions or concerns

## 2022-11-14 NOTE — OP NOTE
OPERATIVE REPORT     Sandra Pickard  1980    Procedure Date: 11/14/22    Pre-op Diagnosis:  Acute cholecystitis    Post-op Diagnosis:  Post-Op Diagnosis Codes:  Same    Procedure:   Laparoscopic Cholecystectomy with Intraoperative Cholangiogram    Surgeon: Zari Belcher MD    Assistant: Karen August PA-C (she was responsible for laparoscopic manipulation of the gallbladder during critical portions of the dissection, handling of the laparoscopic camera, closure of all surgical incisions, and application of topical sterile dressings)    Anesthesia: General    Specimen: Gallbladder    Complications: None    Estimated Blood Loss: minimal    Indications for Operation: Sandra Pickard is a 42 y.o. year old female who presented with acute cholecystitis.  I recommended proceeding with laparoscopic cholecystectomy with IOC.  All risks (including bleeding, infection, damage to surrounding structures), benefits, and alternatives were explained to the patient and she agreed and wished to proceed.  Informed consent was obtained.    Description of Procedure: The patient was taken to the operating room, transferred onto the operating room table, and underwent general endotracheal anesthesia without incident. The patient was prepped and draped in the usual sterile fashion.  Preoperative antibiotics were given, and a timeout was performed.  Half percent Marcaine with epinephrine was injected into the skin and subcutaneous tissues.  Incision was made superior to the umbilicus and the Veress needle was used to initiate pneumoperitoneum.  An Optiview trocar with a 5 30 scope was inserted through each layer of the abdominal wall individually and into the abdomen.  The the area under insertion was inspected and no injuries were noted.  Two 5 mm ports were placed in the right upper and right lateral abdomen under direct visualization.  An 11 mm subxiphoid trocar was placed.  The gallbladder was retracted cranially and laterally to  allow for adequate visualization.  The area around the infundibulum was dissected until the cystic duct and artery were identified. The critical view was obtained.     A cholangiogram was done by placing a clip on the cystic duct and incising it just distal to this.  A cholangiogram catheter was introduced with an Angiocath needle and directed into the cystic duct.  A clip was applied.  With fluoroscopy contrast was injected and confirmed the anatomy and that there were no stones present.  Contrast was seen going into the right and left hepatic ducts as well as the duodenum.  The cholangiogram catheter was then removed.  The cystic duct had 2 clips placed on the bile duct side and was transected.  The same was done for the cystic artery.  Bovie electrocautery was then used to remove the gallbladder from the liver bed.  The gallbladder was placed into a bag.  The area was then irrigated and inspected for hemostasis.  There was no bleeding or bile noted.  The gallbladder was then removed through the subxiphoid port.  The ports were removed under direct visualization. The fascia of the subxiphoid port was closed with 0 Vicryl suture. The incisions were then closed with 4-0 Monocryl sutures and Dermabond.  All needle and lap counts were correct at the end of the case.  The patient was awoken from general endotracheal anesthesia and taken to the recovery area for further monitoring.      DIANE RUBIO M.D.  General, Robotic, and Endoscopic Surgery  [facility]    [address] [place] [address], [address]  [place], [place], [address]  P: [phone]  F: [phone]

## 2022-11-14 NOTE — PLAN OF CARE
Goal Outcome Evaluation:  Plan of Care Reviewed With: patient        Progress: no change  Outcome Evaluation: Lap Cholecystectomy planned today, NPO since midnight, chlorhexidine wipes applied,  ivf infusing, iv abx given, c/o of headache-tylenol given, denies nausea, up ad esther, room air

## 2022-11-14 NOTE — ANESTHESIA PROCEDURE NOTES
Airway  Urgency: elective    Date/Time: 11/14/2022 8:07 AM    General Information and Staff    Patient location during procedure: OR  Anesthesiologist: Anup Johnson MD    Indications and Patient Condition  Indications for airway management: airway protection    Preoxygenated: yes  MILS maintained throughout  Mask difficulty assessment: 1 - vent by mask    Final Airway Details  Final airway type: endotracheal airway      Successful airway: ETT  Cuffed: yes   Successful intubation technique: direct laryngoscopy  Endotracheal tube insertion site: oral  Blade: Amadeo  Blade size: 3  ETT size (mm): 7.5  Cormack-Lehane Classification: grade IIa - partial view of glottis  Placement verified by: chest auscultation   Number of attempts at approach: 1  Assessment: lips, teeth, and gum same as pre-op and atraumatic intubation

## 2022-11-15 LAB
LAB AP CASE REPORT: NORMAL
PATH REPORT.FINAL DX SPEC: NORMAL
PATH REPORT.GROSS SPEC: NORMAL

## 2022-11-16 NOTE — DISCHARGE SUMMARY
GENERAL SURGERY      DATE OF ADMISSION:  11/13/2022  DATE OF DISCHARGE:  11/14/2022    ATTENDING:     Zari Belcher M.D.           CONSULTS:    None    PRINCIPAL DIAGNOSIS:     Acute cholecystitis    DISCHARGE DIAGNOSES:     Same    HOSPITAL PROCEDURES:     Laparoscopic cholecystectomy with IOC    HOSPITAL COURSE:   Admitted, given antibiotics, taken to the operating room, discharged home postoperatively without complication.    ACTIVITY:  Okay to shower.  Ambulate and climb stairs as tolerated.  No lifting over 10 lbs for 2 weeks.  Okay to drive if not taking pain medications.    DIET:  Regular diet as tolerated    FOLLOW UP:  With Dr. Belcher in 2 weeks. Instructed to call [phone] for an appointment.

## 2022-11-29 ENCOUNTER — OFFICE VISIT (OUTPATIENT)
Dept: SURGERY | Facility: CLINIC | Age: 42
End: 2022-11-29

## 2022-11-29 DIAGNOSIS — K81.9 CHOLECYSTITIS: Primary | ICD-10-CM

## 2022-11-29 PROCEDURE — 99024 POSTOP FOLLOW-UP VISIT: CPT | Performed by: SURGERY

## 2022-11-29 NOTE — PROGRESS NOTES
General Surgery Post-Operative Follow Up Note     History of Present Illness:    Sandra Pickard is a 42 y.o. year old female who presents for post-operative follow up from laparoscopic cholecystectomy for cholecystitis.  She has been doing well, but reports being bloated since surgery.  She is having regular bowel movements.  She has loose stool at baseline due to her Crohn's disease.  Otherwise no issues.    Procedure:    • Laparoscopic cholecystectomy with IOC    Pathology:    1. Gallbladder, Cholecystectomy:  Benign gallbladder with               A. Cholelithiasis.               B. Chronic cholecystitis.    Physical Exam:   • Constitutional: Well-developed well-nourished, no acute distress  • Eyes: Conjunctiva normal, sclera nonicteric  • ENMT: Hearing grossly normal, oral mucosa moist  • Respiratory: No increased work of breathing, normal inspiratory effort  • Cardiovascular: Regular rate, no peripheral edema, no jugular venous distention  • Gastrointestinal: Soft, nontender, incisions clean dry and intact  • Skin:  Warm, dry, no rash on visualized skin surfaces  • Musculoskeletal: Symmetric strength, normal gait  • Psychiatric: Alert and oriented ×3, normal affect       Assessment/Plan:  -I would expect the bloating to improve after a few weeks to a month.  She can follow-up if this does not improve or if she has any other issues.  -Otherwise, follow-up as needed      Zari Belcher M.D.  General, Robotic and Endoscopic Surgery  [facility]    [address] [place] [address], [address]  [place], [place], [address]  P: [phone]  F: [phone]     
[id number]

## 2023-02-15 ENCOUNTER — OFFICE VISIT (OUTPATIENT)
Dept: FAMILY MEDICINE CLINIC | Facility: CLINIC | Age: 43
End: 2023-02-15
Payer: COMMERCIAL

## 2023-02-15 VITALS
DIASTOLIC BLOOD PRESSURE: 66 MMHG | RESPIRATION RATE: 16 BRPM | WEIGHT: 195 LBS | HEART RATE: 73 BPM | TEMPERATURE: 97.3 F | BODY MASS INDEX: 32.49 KG/M2 | OXYGEN SATURATION: 100 % | HEIGHT: 65 IN | SYSTOLIC BLOOD PRESSURE: 100 MMHG

## 2023-02-15 DIAGNOSIS — E55.9 VITAMIN D DEFICIENCY: ICD-10-CM

## 2023-02-15 DIAGNOSIS — Z13.220 SCREENING, LIPID: Primary | ICD-10-CM

## 2023-02-15 DIAGNOSIS — Z13.29 SCREENING FOR THYROID DISORDER: ICD-10-CM

## 2023-02-15 DIAGNOSIS — R31.9 HEMATURIA, UNSPECIFIED TYPE: ICD-10-CM

## 2023-02-15 DIAGNOSIS — D64.9 ANEMIA, UNSPECIFIED TYPE: ICD-10-CM

## 2023-02-15 DIAGNOSIS — K50.919 CROHN'S DISEASE WITH COMPLICATION, UNSPECIFIED GASTROINTESTINAL TRACT LOCATION: ICD-10-CM

## 2023-02-15 DIAGNOSIS — Z12.31 ENCOUNTER FOR SCREENING MAMMOGRAM FOR MALIGNANT NEOPLASM OF BREAST: ICD-10-CM

## 2023-02-15 PROCEDURE — 99214 OFFICE O/P EST MOD 30 MIN: CPT | Performed by: PHYSICIAN ASSISTANT

## 2023-02-16 ENCOUNTER — PATIENT ROUNDING (BHMG ONLY) (OUTPATIENT)
Dept: FAMILY MEDICINE CLINIC | Facility: CLINIC | Age: 43
End: 2023-02-16
Payer: COMMERCIAL

## 2023-02-16 NOTE — PROGRESS NOTES
"My name is Efren Grajeda     I am the Practice Manager with   [facility] [address] [address] [phone]      I am messaging to officially welcome you to our practice and ask about your recent visit.     Tell me about your visit with us. What things went well?         We're always looking for ways to make our patients' experiences even better. Do you have recommendations on ways we may improve?       Overall were you satisfied with your first visit to our practice?        Is there anything else I can do for you?     Also, please note that you may receive a survey from \"[employer]\" please take time to fill that out, as it provides important feedback for our office.         Thank you, and have a great day.  "

## 2023-03-22 ENCOUNTER — E-VISIT (OUTPATIENT)
Dept: FAMILY MEDICINE CLINIC | Facility: TELEHEALTH | Age: 43
End: 2023-03-22
Payer: COMMERCIAL

## 2023-03-22 PROCEDURE — BRIGHTMDVISIT: Performed by: NURSE PRACTITIONER

## 2023-03-22 NOTE — E-VISIT TREATED
Chief Complaint: Cold, flu, COVID, sinus, hay fever, or seasonal allergies   Patient introduction   Patient is 42-year-old female with cough and itchy or watery eyes that started 3 to 5 days ago. Regarding date of symptom onset, patient writes: 03/17/23.   COVID-19 exposure, testing history, and vaccination status:    No known exposure to a person with a confirmed or suspected case of COVID-19.    No recent travel outside of their local community.    Patient did a self-test within the last 24 hours. Test result was negative.    Received 2 doses of the COVID-19 vaccine (Moderna, Moderna).    Received their most recent dose of the vaccine more than 14 days ago.   Risk factors for severe disease from COVID-19 infection:    BMI >= 25.    An unspecified autoimmune disorder.   Patient-submitted comments: I just have a nagging dry cough. My nose is clear, a little headache but not much. I feel like the change in weather is to blame. I am a  and we have a big performance tomorrow (our biggest of the year). I'm worried I will cough all the way through it, so I was hoping to get something to help with that. Thank you!.   Patient did not request an excuse note.   General presentation   Symptoms came on gradually.   Fever:    No fever.   Sinus and nasal symptoms:    No nasal or sinus congestion.    No nasal discharge.    No itchy nose or sneezing.   Throat symptoms:    No sore throat.   Head and body aches:    No headache.    No sweats.    No chills.    No myalgia.    No fatigue.   Cough:    Cough is worse in the morning.    Cough is not productive of sputum.   Wheezing and shortness of breath:    No COPD diagnosis.    No asthma diagnosis.    No wheezing.    No shortness of breath.    No previous albuterol inhaler use for URIs, bronchitis, or pneumonia.    No previous steroid inhaler use for URIs, bronchitis, or pneumonia.   Chest pain:    No chest pain.   Ear symptoms:    None.   Dizziness:    No  dizziness.   Allergies:    No history of allergies.   Flu exposure:    No recent known exposure to a person with a confirmed flu diagnosis.    Has not had a flu vaccine this season.   Patient is taking over-the-counter medications for current symptoms, including cetirizine and fluticasone.   Review of red flags/alarm symptoms:    No changes in alertness or awareness.    No decreased urination.   Pregnancy/menstrual status/breastfeeding:    Not pregnant.    Not breastfeeding.    Regarding last menstrual period, patient writes: March 10.   Self-exam:    Height: 165 centimeters    Weight: 95.2 kilograms    No difficulty moving their chin toward their chest.    Neck lymph nodes feel normal.    No periorbital edema.   Recent antibiotic use:    Has not taken antibiotics for similar symptoms within the past month.   Current medications   Currently taking omeprazole 20 MG capsule, vitamin C 250 MG tablet, fluticasone 50 MCG/ACT nasal spray, zinc sulfate 220 (50 Zn) MG capsule, VITAMIN D PO, and inFLIXimab 100 MG injection.   Medication allergies   None.   Medication contraindication review   Patient has history of ulcerative colitis. Therefore, the following medication(s) will not be prescribed:    Clindamycin.   No history of metoclopramide-associated dystonic reaction and tardive dyskinesia.   No known history of amoxicillin-clavulanate-associated cholestatic jaundice or hepatic impairment.   No known history of azithromycin-associated cholestatic jaundice or hepatic impairment.   Past medical history   Immune conditions: an unspecified autoimmune disorder. Patient takes medications regularly for their condition(s). No history of cancer.   Social history   Never smoked tobacco.   Assessment   Acute bronchitis.   This is the likely diagnosis based on patient's interview responses   Plan   Medications:    benzonatate 200 mg capsule RX 200mg 1 cap PO tid PRN 7d for cough. Do not chew or cut these capsules. Amount is 21  cap.   The patient's prescription will be sent to:   [facility]   [address] [facility] [address]   Phone: [phone]     Fax: [phone]   Education:    Condition and causes    Prevention    Treatment and self-care    When to call provider   ----------   Electronically signed by JANICE Rader on 2023-03-22 at 12:41PM   ----------   Patient Interview Transcript:   Please carefully consider each question and answer as best you can. This helps your provider give you the best care. Which of these symptoms are bothering you? Select all that apply.    Cough    Itchy or watery eyes   Not selected:    Shortness of breath    Fever    Stuffed-up nose or sinuses    Runny nose    Itchy nose or sneezing    Loss of smell or taste    Sore throat    Hoarse voice or loss of voice    Headache    Sweats    Chills    Muscle or body aches    Fatigue or tiredness    Nausea or vomiting    Diarrhea    I don't have any of these symptoms   Since your current symptoms started, have you had a viral test for COVID-19? - This includes home self-tests as well as nose swab or saliva tests done at a doctor's office, lab, or testing site. - It does NOT include antibody tests, which use a blood sample to test for past infection. Select one.    Yes   Not selected:    No   When was your most recent COVID-19 test? Select one.    Within the last 24 hours   Not selected:    Within the last week (specify date as MM/DD/YY)    7 to 14 days ago    15 to 30 days ago    More than 1 month ago   What type of COVID-19 test did you most recently have? Select one.    Viral self-test or home test   Not selected:    Viral test at a doctor's office, lab, or testing site   What was the result of your most recent COVID-19 test? Select one.    Negative   Not selected:    Positive   Have you gotten the COVID-19 vaccine? Select one.    Yes   Not selected:    No   How many total doses of the COVID-19 vaccine have you gotten? This includes  "boosters as well as additional doses for those who are immunocompromised. Select one.    2 doses   Not selected:    1 dose    3 doses    4 doses    5 doses   1st dose    Moderna   Not selected:    J&J/Thalia    Novavax    Pfizer   2nd dose    Moderna   Not selected:    J&J/Thalia    Novavax    Pfizer   When did you get your most recent dose of the COVID-19 vaccine?    More than 14 days ago   Not selected:    Less than 48 hours (2 days) ago    48 to 72 hours (3 days) ago    3 to 5 days ago    5 to 7 days ago    7 to 14 days ago   In the last 14 days, have you traveled outside of your local community? This includes travel by car, RV, bus, train, or plane. Travel increases your chances of getting and spreading COVID-19. Select one.    No   Not selected:    Yes   In the last 14 days, have you had close contact with someone who has coronavirus (COVID-19)? \"Close contact\" means any of these: - Living in the same household as someone with COVID-19. - Caring for someone with COVID-19. - Being within 6 feet of someone with COVID-19 for a total of at least 15 minutes over a 24-hour period. For example, three 5-minute exposures for a total of 15 minutes. - Being in direct contact with respiratory droplets from someone with COVID-19 (being coughed on, kissing, sharing utensils). Select one.    No, not that I know of   Not selected:    Yes, a confirmed case    Yes, a suspected case   When did your current symptoms start? Select one.    3 to 5 days ago   Not selected:    Less than 48 hours ago    6 to 9 days ago    10 to 14 days ago    2 to 3 weeks ago    3 to 4 weeks ago    More than a month ago   Do you know the exact date your symptoms started? If so, enter the date as MM/DD/YY. Select one.    Yes (specify): 03/17/23   Not selected:    No   Did your symptoms come on suddenly or gradually? Select one.    Gradually   Not selected:    Suddenly    I'm not sure   Do you cough so hard that it's made you gag or vomit? By gag, we " mean has your coughing made you choke or dry heave? Select all that apply.    No   Not selected:    Yes, my coughing has made me gag    Yes, my coughing has made me vomit   When is your cough the worst? Select all that apply.    In the morning, or when I wake up   Not selected:    During the day    At nighttime, or while I'm sleeping    I haven't noticed a difference depending on time of day   Are you coughing up mucus or phlegm? Select one.    No, my cough is dry   Not selected:    Yes, a little    Yes, a lot   Since your symptoms started, have you felt dizzy? Select one.    No   Not selected:    Yes, but I can continue with my regular daily activities    Yes, and it makes it hard to stand, walk, or do daily activities   Do you have chest pain? You might also feel it as discomfort, aching, tightness, or squeezing in the chest. Select one.    No   Not selected:    Yes   Have you urinated at least 3 times in the last 24 hours? Select one.    Yes   Not selected:    No   Changes in alertness or awareness may mean you need emergency care. Since your symptoms started, have you had any of these? Select all that apply.    None of the above   Not selected:    Confusion    Slurred speech    Not knowing where you are or what day it is    Difficulty staying conscious    Fainting or passing out   Do your symptoms include a whistling sound, or wheezing, when you breathe? Select one.    No   Not selected:    Yes    I'm not sure   Are your eyelids or the areas around your eyes puffy? Select one.    No   Not selected:    Yes, but I can easily open my eye(s)    Yes, and it's hard to open my eye(s)    Yes, and my eye(s) are completely swollen shut   Do you have any of these symptoms in your ear(s)? Select all that apply.    None of the above   Not selected:    Pain    Pressure    Fullness    Crackling or popping    Plugged or blocked sensation   Can you move your chin toward your chest?    Yes   Not selected:    No, my neck is too  stiff   Are your glands/lymph nodes swollen, or does it hurt when you touch them?    No, not that I can tell   Not selected:    Yes   In the past week, has anyone around you (such as at school, work, or home) had a confirmed diagnosis of the flu? A confirmed diagnosis means that a nose swab was done to verify a flu infection. Select all that apply.    No, not that I know of   Not selected:    I live with someone who has the flu    I've been within touching distance of someone who has the flu    I've walked by, or sat about 3 feet away from, someone who has the flu    I've been in the same building as someone who has the flu   Have you ever been diagnosed with asthma? Select one.    No   Not selected:    Yes   Have you ever been prescribed albuterol to use for wheezing, cough, or shortness of breath caused by a cold, bronchitis, or pneumonia? Albuterol (ProAir, Proventil, Ventolin) is prescribed as an inhaler or a solution to be used with a nebulizer machine. Select one.    No, not that I know of   Not selected:    Yes   Have you ever been prescribed a steroid inhaler to use for wheezing, cough, or shortness of breath caused by a cold, bronchitis, or pneumonia? Some examples of steroid inhalers include Pulmicort, Flovent, Qvar, and Alvesco. Select one.    No, not that I know of   Not selected:    Yes   Have you ever been diagnosed with chronic obstructive pulmonary disease (COPD)? Select one.    No, not that I know of   Not selected:    Yes   In the past month, have you taken antibiotics for similar symptoms? Examples of antibiotics include amoxicillin, amoxicillin-clavulanate (Augmentin), penicillin, cefdinir (Omnicef), doxycycline, and clindamycin (Cleocin). Select one.    No   Not selected:    Yes (specify)   Do you have allergies (pollen, dust mites, mold, animal dander)? Select one.    No, not that I know of   Not selected:    Yes   Have you had a flu shot this season? Select one.    No   Not selected:    Yes,  less than 2 weeks ago    Yes, 2 to 4 weeks ago    Yes, 1 to 3 months ago    Yes, 3 to 6 months ago    Yes, more than 6 months ago   Are you pregnant? Select one.    No   Not selected:    Yes   When was your last menstrual period? If you don't currently have periods or no longer have periods, please briefly explain.    March 10   Within the last 2 weeks, have you: - Given birth - Had a miscarriage - Had a pregnancy loss - Had an  Being postpartum (live birth or loss) within the last 2 weeks increases your risk of flu complications. Select one.    No   Not selected:    Yes   Are you breastfeeding? Select one.    No   Not selected:    Yes   The flu and COVID-19 can be more serious for people in certain groups. The next few questions help us figure out if you or anyone you live with is at higher risk for complications from these infections. Do any of these statements apply to you? Select all that apply.    None of the above   Not selected:    I'm     I'm     I'm Black    I'm  or    Do you smoke tobacco? Select one.    No   Not selected:    Yes, every day    Yes, some days    No, I quit   Do you have any of these conditions? Select all that apply.    None of the above   Not selected:    Chronic lung disease, such as cystic fibrosis or interstitial fibrosis    Heart disease, such as congenital heart disease, congestive heart failure, or coronary artery disease    Disorder of the brain, spinal cord, or nerves and muscles, such as dementia, cerebral palsy, epilepsy, muscular dystrophy, or developmental delay    Metabolic disorder or mitochondrial disease    Cerebrovascular disease, such as stroke or another condition affecting the blood vessels or blood supply to the brain    Down syndrome    Mood disorder, including depression or schizophrenia spectrum disorders    Substance use disorder, such as alcohol, opioid, or cocaine use disorder    Tuberculosis   Do you live in a  group care setting? Examples include: - Nursing home - Residential care - Psychiatric treatment facility - [facility] - Homeless shelter - Foster care setting Select one.    No   Not selected:    Yes   Are you a healthcare worker? Select one.    No   Not selected:    Yes   People with a very high body mass index (BMI) are at higher risk for developing complications from the flu and severe illness from COVID-19. To determine your BMI, we need to know your weight and height. Please enter your weight (in pounds).    Weight   Please enter your height.    Height   Do you have any of these conditions that can affect the immune system? Scroll to see all options. Select all that apply.    An autoimmune disorder not listed here   Not selected:    History of bone marrow transplant    Chronic kidney disease    Chronic liver disease (including cirrhosis)    HIV/AIDS    Inflammatory bowel disease (Crohn's disease or ulcerative colitis)    Lupus    Moderate to severe plaque psoriasis    Multiple sclerosis    Rheumatoid arthritis    Sickle cell anemia    Alpha or beta thalassemia    History of solid organ transplant (kidney, liver, or heart)    History of spleen removal    A condition requiring treatment with long-term use of oral steroids (such as prednisone, prednisolone, or dexamethasone)    None of these   Do you take medications for your condition? This includes oral and injectable medications that are taken daily, weekly, or monthly. Select one.    Yes, regularly   Not selected:    Yes, for flare-ups only    No   Have you ever been diagnosed with cancer? Select one.    No   Not selected:    Yes, I have cancer now    Yes, but I'm in remission   Do any of these apply to you? Select all that apply.    None of the above   Not selected:    I've been hospitalized within the last 5 days    I have diabetes    I'm in close contact with a child in    The flu and COVID-19 can be more serious for  people in certain groups. Do any of these apply to the people who live with you? Select all that apply.    None of the above   Not selected:    Under age 5    Over age 65            Black     or     Pregnant    Has given birth, had a miscarriage, had a pregnancy loss, or had an  in the last 2 weeks   Does any member of your household have any of these medical conditions? Select all that apply.    None of the above   Not selected:    Asthma    Disorders of the brain, spinal cord, or nerves and muscles, such as dementia, cerebral palsy, epilepsy, muscular dystrophy, or developmental delay    Chronic lung disease, such as COPD or cystic fibrosis    Heart disease, such as congenital heart disease, congestive heart failure, or coronary artery disease    Cerebrovascular disease, such as stroke or another condition affecting the blood vessels or blood supply to the brain    Blood disorders, such as sickle cell disease    Diabetes    Metabolic disorders such as inherited metabolic disorders or mitochondrial disease    Kidney disorders    Liver disorders    Weakened immune system due to illness or medications such as chemotherapy or steroids    Children under the age of 19 who are on long-term aspirin therapy    Extreme obesity (BMI > 40)   Do you have any of these conditions? Scroll to see all options. Select all that apply.    Ulcerative colitis   Not selected:    Aspirin triad (also known as Samter's triad or ASA triad)    Asthma or hives from taking aspirin or other NSAIDs, such as ibuprofen or naproxen    Blockage or narrowing of the blood vessels of the heart    Blood clotting disorder    Blood dyscrasia, such anemia, leukemia, lymphoma, or myeloma    Bone marrow depression    Catecholamine-releasing paraganglioma    Congenital long QT syndrome    Depression    Difficulty urinating or completely emptying your bladder    Uncorrected electrolyte abnormalities    Fungal  infection    Gastrointestinal (GI) bleeding    Gastrointestinal (GI) obstruction    G6PD deficiency    Recent heart attack    High blood pressure    Irregular heartbeat or heart rhythm    Mononucleosis (mono)    Myasthenia gravis    Parkinson's disease    Pheochromocytoma    Reye syndrome    Seizure disorder    Thyroid disease    None of the above   Have you ever had either of these conditions? Select all that apply.    No   Not selected:    Metoclopramide-associated dystonic reaction    Tardive dyskinesia   Just a few more questions about medications, and then you're finished. Have you used any non-prescription medications or nasal sprays for your current symptoms? Examples include saline sprays, decongestants, NyQuil, and Tylenol. Select one.    Yes   Not selected:    No   Which of these non-prescription medications have you tried? Scroll to see all options. Select all that apply.    Cetirizine (Zyrtec)    Fluticasone (Flonase)   Not selected:    Acetaminophen (Tylenol)    Budesonide (Rhinocort)    Chlorpheniramine (Aller-chlor, Chlor-Trimeton)    Cromolyn (NasalCrom)    Dextromethorphan (Delsym, Robitussin, Vicks DayQuil Cough)    Diphenhydramine (Benadryl)    Fexofenadine (Allegra)    Guaifenesin (Mucinex)    Guaifenesin/dextromethorphan (Delsym DM, Mucinex DM, Robitussin DM)    Ibuprofen (Advil, Motrin, Midol)    Ketotifen (Alaway, Zaditor)    Loratadine (Alavert, Claritin)    Naphazoline-pheniramine (Naphcon-A, Opcon-A, Visine-A)    Omeprazole (Prilosec)    Oxymetazoline (Afrin)    Phenylephrine (Sudafed PE)    Triamcinolone (Nasacort)    None of the above   Have you taken any monoamine oxidase inhibitor (MAOI) medications in the last 14 days? Examples include rasagiline (Azilect), selegiline (Eldepryl, Zelapar), isocarboxazid (Marplan), phenelzine (Nardil), and tranylcypromine (Parnate). Select one.    No, not that I know of   Not selected:    Yes   Do you take Kynmobi or Apokyn (apomorphine)? Select one.     No   Not selected:    Yes   Are you still taking these medications listed in your medical record? If you're not taking any of these, click Next. Select all that apply.    omeprazole 20 MG capsule    vitamin C 250 MG tablet    fluticasone 50 MCG/ACT nasal spray    zinc sulfate 220 (50 Zn) MG capsule    VITAMIN D PO    inFLIXimab 100 MG injection   Are you taking any other medications, vitamins, or supplements? Select one.    No   Not selected:    Yes   Have you ever had an allergic or bad reaction to any medication? Select one.    No   Not selected:    Yes   Are you allergic to milk or to the proteins found in milk (for example, whey or casein)? A milk allergy is different from lactose intolerance. Select one.    No, not that I know of   Not selected:    Yes   Have you ever had jaundice or liver problems as a result of taking amoxicillin-clavulanate (Augmentin)? Jaundice is a condition in which the skin and the whites of the eyes turn yellow. Select all that apply.    No, not that I know of   Not selected:    Yes, jaundice    Yes, liver problems   Have you ever had jaundice or liver problems as a result of taking azithromycin (Zithromax, Zmax)? Jaundice is a condition in which the skin and the whites of the eyes turn yellow. Select all that apply.    No, not that I know of   Not selected:    Yes, jaundice    Yes, liver problems   Do you need a doctor's note? A doctor's note confirms that you received care today and states when you can return to school or work. It does not contain information about your diagnosis or treatment plan. Your provider will make the final decision on whether to give you a doctor's note and for how long. Doctor's notes CANNOT be backdated. We can't provide medical leave paperwork through this type of visit. If more paperwork is needed to request time off, contact your primary care provider. Select one.    No   Not selected:    Today only (1 day)    Today and tomorrow (2 days)    3 days    5  days    7 days    10 days    14 days   Is there anything else you'd like to tell us about your symptoms?    I just have a nagging dry cough. My nose is clear, a little headache but not much. I feel like the change in weather is to blame. I am a  and we have a big performance tomorrow (our biggest of the year). I'm worried I will cough all the way through it, so I was hoping to get something to help with that. Thank you!   ----------   Medical history   Medical history data does not currently exist for this patient.

## 2023-03-22 NOTE — EXTERNAL PATIENT INSTRUCTIONS
Diagnosis   Acute bronchitis (chest cold)   My name is Clair Hill, and I'm a healthcare provider at [facility]. Based on your responses, I see that you have a chest cold, also known as acute bronchitis.   To prevent the spread of illness to others, I recommend that you stay home and away from other people as much as possible while you're sick. When you need to be around other people, consider wearing a face mask.   Here are the main things to know about your current symptoms:    Acute bronchitis gets better on its own within 1 to 3 weeks.    The medications I recommend here will help ease your symptoms.   Based on what you told me in your interview, I haven't prescribed any antibiotics. Antibiotics fight bacteria, not viruses. Your bronchitis is caused by a virus, so antibiotics won't help. They could even make you feel worse as they can cause or worsen nausea, diarrhea, and stomach pain. The following factors suggest that a virus is causing your symptoms:    You've had symptoms for less than 10 days. A bacterial infection is suspected when you've had symptoms longer than 10 days without improvement.    You don't have sinus pain. Bacterial sinusitis causes sinus pain or pressure.    You don't have a sore throat. A bacterial infection like strep throat causes a severe sore throat.    You haven't had a fever. Bacterial infections can cause a high fever.   Medications   Your pharmacy   [facility] [address] [place] [address]. [facility] [address] [phone]     Prescription   Benzonatate (200mg): Take 1 capsule by mouth 3 times a day as needed for cough. Do not chew or cut these capsules.   About your diagnosis   The bronchial tubes carry air to the lungs. Acute bronchitis happens when these tubes become irritated and inflamed. Smoke and air pollution can cause acute bronchitis, but it's usually caused by a virus. The viruses that cause the common cold or the flu can also cause bronchitis.   Common  symptoms of acute bronchitis include coughing up mucus or phlegm, wheezing, and chest tightness. Cold symptoms along with fatigue, body aches, difficulty sleeping, or decreased appetite, are also common.   What to expect   If you follow this treatment plan, you should feel better in 1 to 3 weeks.   When to seek care   Call us at [phone]   with any sudden or unexpected symptoms.    Fever that measures over 103F or continues for more than 3 days.    A worsening headache.    You develop severe shortness of breath or chest tightness.    Sinus pain that lasts for more than 10 days, without improvement.    Swallowing becomes extremely difficult or impossible.    More than 5 episodes of diarrhea in a day.    More than 5 episodes of vomiting in a day.    Coughing up red or bloody mucus.    Severe stomach pain.    Symptoms that get better for a few days, and then suddenly get worse.    Severe chest pain    Your treatment plan isn't working. Your immune condition may mean that you need more care.   Other treatment    Rest! Your body needs rest to recover and fight infection.    Drink plenty of water to stay hydrated.    If your nose or sinuses become very stuffy, try using a Neti Pot to flush them out. Neti Pots are available at any drugstore without a prescription.    Gargle with salt water several times a day to help your throat feel better. Cough drops and throat lozenges may provide extra relief. A teaspoon of honey stirred into warm water or weak tea can help soothe a sore throat and cough.    Avoid smoke and air pollution. Smoke can make infections worse.   Prevention    Avoid close contact with other people when you're sick.    Cover your mouth and nose when you cough or sneeze. Use a tissue or cough into your elbow. Make sure that used tissues go directly into the trash.    Avoid touching your eyes, nose, or mouth while you're sick.    Wash your hands often, especially after coughing, sneezing, or blowing  your nose. If soap and water are not available, use an alcohol-based hand .    If you or someone in your home or workplace is sick, disinfect commonly used items. This includes door handles, tables, computers, remotes, and pens.    Coronavirus (COVID-19) information   Common symptoms of COVID-19 include fever, cough, shortness of breath, fatigue, muscle or body aches, headaches, new loss of sense of taste or smell, sore throat, stuffy or runny nose, nausea or vomiting, and diarrhea. Most people who get COVID-19 have mild symptoms and can rest at home until they get better. Elderly people and those with chronic medical problems may be at risk for more serious complications.   FAQs about the COVID-19 vaccine   There are four authorized COVID-19 vaccines: [employer]'s Thalia Vaccine (J&J/Thalia), Moderna, Novavax, and Pfizer-[employer] (Pfizer). The J&J/Thalia and Novavax vaccines are approved for use in people aged 18 and older. The Moderna and Pfizer vaccines are approved for those aged 6 months and older. All four are available at no cost. Even if you don't have health insurance, you can still get the COVID-19 vaccine for free.   Which vaccine is the best? Which vaccine should I get?   All four vaccines are highly effective. Even if you get COVID-19 after being vaccinated, all of the vaccines help prevent severe disease, hospitalization, and complications.   Most people should get whichever vaccine is first available to them. However, women younger than 50 years old should consider the rare risk of blood clots with low platelets after vaccination with the J&J/Thalia vaccine. This risk hasn't been seen with the other three vaccines.   Are the vaccines safe?   Yes. Hundreds of millions of people in the [place] have already safely received COVID-19 vaccines under the most intense safety monitoring in the history of the [place].   Do I need the vaccine if I've already had COVID?   Yes. Vaccination helps protect  you even if you've already had COVID.   If you had COVID-19 and had symptoms, wait to get vaccinated until you've recovered and completed your isolation period.   If you tested positive for COVID-19 but did not have symptoms, you can get vaccinated after 5 full days have passed since you had a positive test, as long as you don't develop symptoms.   How many doses of the vaccine do I need?   Visit www.cdc.gov/coronavirus/2019-ncov/vaccines/stay-up-to-date.html   to find out how to stay up to date with your COVID-19 vaccines.   I'm immunocompromised. How many doses of the vaccine do I need?   For information on how immunocompromised people can stay up to date with their COVID-19 vaccines, visit www.cdc.gov/coronavirus/2019-ncov/vaccines/recommendations/immuno.html  .   What are the common side effects of the vaccine?   A sore arm, tiredness, headache, and muscle pain may occur within two days of getting the vaccine and last a day or two. For the Moderna or Pfizer vaccines, side effects are more common after the second dose. People over the age of 55 are less likely to have side effects than younger people.   After I'm up to date on vaccines, can I still get or spread COVID?   Yes, you can still get COVID, but your disease should be milder. And your risk of serious illness, hospitalization, and complications will be much lower, especially if you're up to date. Unfortunately, you can still spread COVID if you've been vaccinated. That's why it's important to follow isolation guidelines if you get sick or test positive.   After I'm up to date on vaccines, can I go back to normal?   You should still wear a mask indoors in public if:    It's required by laws, rules, regulations, or local guidance.    You have a weakened immune system.    Your age puts you at increased risk of severe disease.    You have a medical condition that puts you at increased risk of severe disease.    Someone in your household has a weakened immune  system, is at increased risk for severe disease, or is unvaccinated.    You're in an area of high transmission.   Where can I get a COVID-19 vaccine?   Visit [facility]'s website for more information. To find a COVID-19 vaccination site near you, visit www.[employer].gov/  , call 1-427.648.5494  , or text your zip code to 399487 ([place]). Message and data rates may apply.   What about travel?   Travel increases your risk of exposure to COVID-19. For more information, see www.cdc.gov/coronavirus/2019-ncov/travelers/index.html  .   I've had close contact with someone who has COVID. Do I need to quarantine, and if so, for how long?   For the most current answer, including a calculator to determine whether you need to stay home and for how long, visit www.cdc.gov/coronavirus/2019-ncov/your-health/quarantine-isolation.html  .   I've tested positive for COVID. How long do I need to isolate?   For the latest recommendations, including a calculator to determine how long you need to stay home, visit www.cdc.gov/coronavirus/2019-ncov/your-health/quarantine-isolation.html  .   What if I develop symptoms that might be from COVID?   For the latest recommendations on what to do if you're sick, including when to seek emergency care, visit www.cdc.gov/coronavirus/2019-ncov/if-you-are-sick/steps-when-sick.html  .    Flu vaccine information   Who should get a flu vaccine?   Everyone 6 months of age and older should get a yearly flu vaccine.   When should I get vaccinated?   You should get a flu vaccine by the end of October. Once you're vaccinated, it takes about two weeks for antibodies to develop and protect you against the flu. That's why it's important to get vaccinated as soon as possible.   After October, is it too late to get vaccinated?   No. You should still get vaccinated. As long as the flu viruses are still in your community, flu vaccines will remain available, even into January of next year or later.   Why do I need a  flu vaccine EVERY year?   Flu viruses are constantly changing, so flu vaccines are usually updated from one season to the next. Your protection from the flu vaccine also lessens over time.   Is the flu vaccine safe?   Yes. Over the last 50 years, hundreds of millions of Americans have safely received the flu vaccines.   What are the side effects of flu vaccines?   You CANNOT get the flu from a flu vaccine. Common side effects of the flu shot include soreness, redness and/or swelling where the shot was given, low grade fever, and aches. Common side effects of the nasal spray flu vaccine for adults include runny nose, headaches, sore throat, and cough. For children, side effects include wheezing, vomiting, muscle aches, and fever.   Does the flu vaccine increase your risk of getting COVID-19?   No. There is no evidence that getting a flu vaccine increases your risk of getting COVID-19.   Is it safe to get the flu vaccine along with a COVID-19 vaccine?   Yes. It's safe to get the flu vaccine with a COVID-19 vaccine or booster.   Contact your healthcare provider TODAY for details on when and where to get your flu vaccine.   Your provider   Your diagnosis was provided by JANICE Rader, a member of your trusted care team at [facility].   If you have any questions, call us at [phone]  .

## 2023-07-25 DIAGNOSIS — N39.0 URINARY TRACT INFECTION WITHOUT HEMATURIA, SITE UNSPECIFIED: Primary | ICD-10-CM

## 2023-07-25 RX ORDER — NITROFURANTOIN 25; 75 MG/1; MG/1
100 CAPSULE ORAL 2 TIMES DAILY
Qty: 14 CAPSULE | Refills: 0 | Status: SHIPPED | OUTPATIENT
Start: 2023-07-25

## 2023-12-15 ENCOUNTER — TELEPHONE (OUTPATIENT)
Dept: FAMILY MEDICINE CLINIC | Facility: CLINIC | Age: 43
End: 2023-12-15

## 2023-12-15 ENCOUNTER — OFFICE VISIT (OUTPATIENT)
Dept: FAMILY MEDICINE CLINIC | Facility: CLINIC | Age: 43
End: 2023-12-15
Payer: COMMERCIAL

## 2023-12-15 VITALS
BODY MASS INDEX: 36.49 KG/M2 | TEMPERATURE: 97.5 F | SYSTOLIC BLOOD PRESSURE: 112 MMHG | OXYGEN SATURATION: 99 % | HEIGHT: 65 IN | HEART RATE: 80 BPM | WEIGHT: 219 LBS | DIASTOLIC BLOOD PRESSURE: 68 MMHG

## 2023-12-15 DIAGNOSIS — L30.9 ECZEMA, UNSPECIFIED TYPE: ICD-10-CM

## 2023-12-15 DIAGNOSIS — E16.2 HYPOGLYCEMIA: Primary | ICD-10-CM

## 2023-12-15 DIAGNOSIS — R31.9 HEMATURIA, UNSPECIFIED TYPE: ICD-10-CM

## 2023-12-15 DIAGNOSIS — E83.19 IRON EXCESS: ICD-10-CM

## 2023-12-15 DIAGNOSIS — K50.919 CROHN'S DISEASE WITH COMPLICATION, UNSPECIFIED GASTROINTESTINAL TRACT LOCATION: ICD-10-CM

## 2023-12-15 DIAGNOSIS — E55.9 VITAMIN D DEFICIENCY: ICD-10-CM

## 2023-12-15 DIAGNOSIS — F43.23 ADJUSTMENT DISORDER WITH MIXED ANXIETY AND DEPRESSED MOOD: ICD-10-CM

## 2023-12-15 DIAGNOSIS — Z82.49 FAMILY HISTORY OF ARRHYTHMIA: ICD-10-CM

## 2023-12-15 DIAGNOSIS — D64.9 ANEMIA, UNSPECIFIED TYPE: ICD-10-CM

## 2023-12-15 PROBLEM — R19.7 DIARRHEA: Status: ACTIVE | Noted: 2020-06-01

## 2023-12-15 PROBLEM — L73.2 HIDRADENITIS SUPPURATIVA: Status: ACTIVE | Noted: 2020-06-01

## 2023-12-15 PROBLEM — L40.9 PSORIASIS OF SCALP: Status: ACTIVE | Noted: 2023-04-06

## 2023-12-15 PROBLEM — K80.20 CALCULUS OF GALLBLADDER: Status: ACTIVE | Noted: 2020-06-02

## 2023-12-15 PROBLEM — K21.9 GASTROESOPHAGEAL REFLUX DISEASE: Status: ACTIVE | Noted: 2020-06-01

## 2023-12-15 PROBLEM — M25.50 ARTHRALGIA: Status: ACTIVE | Noted: 2020-06-01

## 2023-12-15 PROBLEM — J20.9 ACUTE BRONCHITIS: Status: ACTIVE | Noted: 2023-12-15

## 2023-12-15 PROCEDURE — 99214 OFFICE O/P EST MOD 30 MIN: CPT | Performed by: PHYSICIAN ASSISTANT

## 2023-12-15 RX ORDER — FAMOTIDINE 20 MG/1
20 TABLET, FILM COATED ORAL 2 TIMES DAILY
COMMUNITY

## 2023-12-15 RX ORDER — BUPROPION HYDROCHLORIDE 150 MG/1
150 TABLET ORAL EVERY MORNING
Qty: 30 TABLET | Refills: 0 | Status: SHIPPED | OUTPATIENT
Start: 2023-12-15

## 2023-12-15 NOTE — TELEPHONE ENCOUNTER
I tried to call patient to let her know that she left her paperwork in the office and it will be in the front office for her to swing by and .     HUB OKAY TO READ

## 2023-12-15 NOTE — PROGRESS NOTES
Subjective   Sandra Pickard is a 43 y.o. female who presents today in follow up of initial appt, glucose, vitamin D deficiency, elevated iron, hematuria, elevated bilirubin, Crohn's disease with immunosuppression, psoriasis vs atopic dermatitis, suppurative hidradenitis, acne, and specialists and evaluation of blood sugars and moods.     Blood Sugar Problem    Anxiety        Previous PCP- 2015- JANICE Dan-seen for possible UTI, ear and nose problems.  She went to a dermatologist for some skin issues and told she had psoriasis in her right ear canal.  Sore on the right side of her nose that she has been treating with Neosporin.  She was treated with Omnicef 300 mg twice daily for 10 days for otitis serous media with effusion and possible UTI.  Referred to ENT for further evaluation.  Follow-up as needed.  2014- seen for CPE with Pap.    Previous 279 and got down to 189 lbs. Weight Watchers. She has gained some. That helped symptoms. Thinks has gained 20 lbs.  Nonfasting.   Hypoglycemia- Eats breakfast- thinks had sausage biscuit and a coke before going to get labs and glucose was 64.   Has had low blood sugar a couple times with labs.   Never had elevated blood sugar. 6 weeks prior to labs in November was the 1st time she noticed.   Did not feel bad when she checked. No episodes of sweating, weakness, dizziness, change in mental status, or syncopal/ near syncopal episodes.   Vitamin D deficiency- taking vitamin D 5000 IU daily.   Anemia, elevated iron- thinks was better- no trouble that she is aware. Elevated iron 7/2023  Required transfusions [id number] when dx Crohn's.   Menses- getting worse- some months that she has to change every 15 minutes. On an average month- 1st or 2nd day, changes every few hours and is normal. Last about 4-5 days. The 1st severe period in 2020 and may have had 2-3 since then. They are over quickly though. She does not seem to have multiple days of that- either stops or very light.  No further blood in stool.   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 Was not on menses then but is now.   Elevated bilirubin- had been normal prior to labs  but elevated 2023.     Crohn's disease- not seen GI since . Remicade every 6 weeks. Thinking she may need to change. Has to change because infusion nurse is leaving. They are likely setting    Cholelithiasis s/p cholecystectomy-   - found gallstones and referred to surgeon. She was seen and was not symptomatic. They advised she could wait. Then got sick in 2022. Thought she had stomach bug. She went to ER.   Patient admitted to the hospital 2022 through 2022 for lap ana for acute cholecystitis.  She was seen in follow-up 2022 and advised they would expect bloating to improve after a few weeks to a month and follow-up if no improvement or worsening.    Depression and anxiety- she would like to start Wellbutrin. She continues with symptoms.   She had frequently with divorce but not since. Has been anxious a couple times but not until that point until recently.    2023- Panic attack a couple weeks prior-  and was at a game she was not playing at and son had to drive her home. She was shaky- thought she needed to eat, chest heaviness, felt weak and tired, felt cold.   Worry about kids with license and paying insurance, uncle  of pancreatic cancer yesterday. Mother with metastatic breast cancer. Mother seems ok- dx  with breast cancer and they did lumpectomy. 1 year later, they repeat scans and mets to lung- 5 L fluid on it. No longer needing to drain her lung but now in liver. On chemo and talking about changes. mothr with PalV2 mutation- patient has been checked and is negative. Sister and Aunt are positive. MGM negative.   Working at [employer] in the week and .   Daily not feeling depressed or anxious.   Previous medications- Zoloft- made a shell of human  being- no emotion- not able to cry or laugh. Wellbutrin- worked at the time but thinks she had to get through what she was going through. No AE.    FHx arrhythmia- Mother had to have pacemaker/ AICD- she was passing out and had times with very slow rate. Placed at 48-49 years of age.     Psoriasis versus atopic dermatitis- not trouble with it. She is ok.   She reports she has always had psoriasis. No trouble right now. At one point lost [place] of hair. She has arthritis issues- not sure the cause but thinks it was from Crohn's. It has not been main focus.   Suppurative hidradenitis- had really bad in axilla and had a couple in groin but not as bad. Remicade and topical treatment as needed and occasionally. Weight loss helped a lot.   Acne- had in the past but not now.     Patient's specialists:  GI- JANICE Cao- procedure visit 2022 for Remicade for Crohn's disease.  General surgery- Dr. Zari Belcher-last appointment 2022 for 4 week follow-up from cholecystectomy for cholecystitis.  Follow-up as needed.      Past Medical History:   Diagnosis Date    Cholelithiasis     Crohn's disease     GI (gastrointestinal bleed)     Crohn’s Disease    History of transfusion  &     Due to Crohn’s disease relared blood loss.    Rectal bleeding     I was diagnosed with Crohn’s Disease     Past Surgical History:   Procedure Laterality Date     SECTION      CHOLECYSTECTOMY  2022    CHOLECYSTECTOMY WITH INTRAOPERATIVE CHOLANGIOGRAM N/A 2022    Procedure: CHOLECYSTECTOMY LAPAROSCOPIC INTRAOPERATIVE CHOLANGIOGRAM;  Surgeon: Zari Belcher MD;  Location: [facility];  Service: General;  Laterality: N/A;    COLONOSCOPY N/A     [facility]     Social History     Socioeconomic History    Marital status:    Tobacco Use    Smoking status: Never    Smokeless tobacco: Never   Vaping Use    Vaping Use: Never used   Substance and Sexual Activity    Alcohol use: Never     Drug use: Never    Sexual activity: Yes     Partners: Male     Birth control/protection: Vasectomy      Family History   Problem Relation Age of Onset    Breast cancer Mother     Arthritis Mother     Cancer Mother         Metastatic breast Cancer    Arthritis Maternal Grandmother     Arthritis Paternal Grandmother     Malig Hyperthermia Neg Hx         The following portions of the patient's history were reviewed and updated as appropriate: allergies, current medications, past family history, past medical history, past social history, past surgical history and problem list.    Review of Systems    Objective   Vitals:    12/15/23 1430   BP: 112/68   Pulse: 80   Temp: 97.5 °F (36.4 °C)   SpO2: 99%     Class 2 Severe Obesity (BMI >=35 and <=39.9). Obesity-related health conditions include the following: none. Obesity is worsening. BMI is is above average; BMI management plan is completed. We discussed portion control and increasing exercise.    Physical Exam  Vitals and nursing note reviewed.   Constitutional:       General: She is not in acute distress.     Appearance: Normal appearance. She is well-developed.   HENT:      Head: Normocephalic and atraumatic.      Right Ear: External ear normal.      Left Ear: External ear normal.      Nose: Nose normal.   Eyes:      General: Lids are normal.      Conjunctiva/sclera: Conjunctivae normal.   Neck:      Vascular: No carotid bruit.   Cardiovascular:      Rate and Rhythm: Normal rate and regular rhythm.      Heart sounds: Normal heart sounds. No murmur heard.     No friction rub. No gallop.   Pulmonary:      Effort: Pulmonary effort is normal. No respiratory distress.      Breath sounds: Normal breath sounds. No wheezing, rhonchi or rales.   Musculoskeletal:         General: No deformity.      Cervical back: Neck supple.   Skin:     General: Skin is warm and dry.   Neurological:      Mental Status: She is alert and oriented to person, place, and time.      Gait: Gait  normal.   Psychiatric:         Speech: Speech normal.         Behavior: Behavior normal.         Thought Content: Thought content normal.         Assessment & Plan   Diagnoses and all orders for this visit:    1. Hypoglycemia (Primary)  -     Comprehensive Metabolic Panel  -     Hemoglobin A1c    2. Vitamin D deficiency    3. Iron excess  -     CBC & Differential  -     Iron and TIBC  -     Ferritin  -     Vitamin B12 & Folate    4. Hematuria, unspecified type    5. Anemia, unspecified type    6. Crohn's disease with complication, unspecified gastrointestinal tract location  -     Ambulatory Referral to Gastroenterology  -     CBC & Differential  -     Comprehensive Metabolic Panel  -     Vitamin B12 & Folate    7. Adjustment disorder with mixed anxiety and depressed mood  -     buPROPion XL (Wellbutrin XL) 150 MG 24 hr tablet; Take 1 tablet by mouth Every Morning.  Dispense: 30 tablet; Refill: 0  -     CBC & Differential  -     Comprehensive Metabolic Panel  -     Iron and TIBC  -     Ferritin  -     Vitamin B12 & Folate    8. Family history of arrhythmia    9. Eczema, unspecified type          Assessment and Plan  Patient will have fasting labs. Call if no results in 1 week. Stability of conditions, plan, follow up, and further recommendations pending labs. Follow up in 1 month for re-evaluation of moods.    Hypoglycemia- I will check labs today with A1C for further evaluation. Patient should avoid refined sugars- stop sodas, juices, sweet tea, gatorade, sugary drinks, alcohol, sweets, sugars, and decrease starches- pasta, potatoes, bread.    Vitamin D deficiency- Continue vitamin D 5000 IU daily. Dosing recommendations pending labs.   Anemia, elevated iron- Await labs for further recommendations.   Hematuria- She reports blood in urine when not on menses but on menses now. We will need to re-evaluate for blood when not on menses and make further recommendations pending recheck.   Elevated bilirubin- I will  recheck and make further recommendations.   Crohn's disease- She needs to see GI in follow up. She continues Remicade but is not following with GI. She needs to schedule appt with provider for follow up.     Cholelithiasis s/p cholecystectomy- If persistently elevated bilirubin, we will consider repeat imaging. She should be seen if any GI symptoms.   Adjustment reaction with depression and anxiety- Patient with worsening moods. I will restart Wellbutrin  mg daily. To be seen if worsening moods or AE with medication. Otherwise, to follow up in 1 month for re-evaluation of moods.   FHx arrhythmia- We will need to consider cardiology workup to ensure symptoms of panic attack are not from arrhythmia if patient has any recurrence of symptoms.   Psoriasis versus atopic dermatitis- Patient will need to see dermatology if worsening, new, or changing symptoms or uncontrolled dermatitis.   Suppurative hidradenitis- She has had improvement with weight loss and Remicade. We will consider dermatology if uncontrolled symptoms.   Acne- Acne improved. To see dermatology if uncontrolled.     I spent 35 minutes caring for Sandra Pickard on this date of service. This time includes time spent by me in the following activities as necessary: preparing for the visit, reviewing tests, specialists records and previous visits, obtaining and/or reviewing a separately obtained history, performing a medically appropriate exam and/or evaluation, counseling and educating the patient, family, caregiver, referring and/or communicating with other healthcare professionals, documenting information in the medical record, independently interpreting results and communicating that information with the patient, family, caregiver, and developing a medically appropriate treatment plan with consideration of other conditions, medications, and treatments.

## 2023-12-16 LAB
ALBUMIN SERPL-MCNC: 4.4 G/DL (ref 3.9–4.9)
ALBUMIN/GLOB SERPL: 1.4 {RATIO} (ref 1.2–2.2)
ALP SERPL-CCNC: 67 IU/L (ref 44–121)
ALT SERPL-CCNC: 12 IU/L (ref 0–32)
AST SERPL-CCNC: 18 IU/L (ref 0–40)
BASOPHILS # BLD AUTO: 0 X10E3/UL (ref 0–0.2)
BASOPHILS NFR BLD AUTO: 0 %
BILIRUB SERPL-MCNC: 1.1 MG/DL (ref 0–1.2)
BUN SERPL-MCNC: 14 MG/DL (ref 6–24)
BUN/CREAT SERPL: 20 (ref 9–23)
CALCIUM SERPL-MCNC: 9 MG/DL (ref 8.7–10.2)
CHLORIDE SERPL-SCNC: 104 MMOL/L (ref 96–106)
CO2 SERPL-SCNC: 22 MMOL/L (ref 20–29)
CREAT SERPL-MCNC: 0.69 MG/DL (ref 0.57–1)
EGFRCR SERPLBLD CKD-EPI 2021: 111 ML/MIN/1.73
EOSINOPHIL # BLD AUTO: 0.1 X10E3/UL (ref 0–0.4)
EOSINOPHIL NFR BLD AUTO: 2 %
ERYTHROCYTE [DISTWIDTH] IN BLOOD BY AUTOMATED COUNT: 12.8 % (ref 11.7–15.4)
FERRITIN SERPL-MCNC: 21 NG/ML (ref 15–150)
FOLATE SERPL-MCNC: 8.3 NG/ML
GLOBULIN SER CALC-MCNC: 3.1 G/DL (ref 1.5–4.5)
GLUCOSE SERPL-MCNC: 88 MG/DL (ref 70–99)
HBA1C MFR BLD: 5.1 % (ref 4.8–5.6)
HCT VFR BLD AUTO: 39.4 % (ref 34–46.6)
HGB BLD-MCNC: 13.1 G/DL (ref 11.1–15.9)
IMM GRANULOCYTES # BLD AUTO: 0 X10E3/UL (ref 0–0.1)
IMM GRANULOCYTES NFR BLD AUTO: 0 %
IRON SATN MFR SERPL: 23 % (ref 15–55)
IRON SERPL-MCNC: 70 UG/DL (ref 27–159)
LYMPHOCYTES # BLD AUTO: 3.2 X10E3/UL (ref 0.7–3.1)
LYMPHOCYTES NFR BLD AUTO: 34 %
MCH RBC QN AUTO: 28.7 PG (ref 26.6–33)
MCHC RBC AUTO-ENTMCNC: 33.2 G/DL (ref 31.5–35.7)
MCV RBC AUTO: 86 FL (ref 79–97)
MONOCYTES # BLD AUTO: 0.5 X10E3/UL (ref 0.1–0.9)
MONOCYTES NFR BLD AUTO: 5 %
NEUTROPHILS # BLD AUTO: 5.6 X10E3/UL (ref 1.4–7)
NEUTROPHILS NFR BLD AUTO: 59 %
PLATELET # BLD AUTO: 215 X10E3/UL (ref 150–450)
POTASSIUM SERPL-SCNC: 4.3 MMOL/L (ref 3.5–5.2)
PROT SERPL-MCNC: 7.5 G/DL (ref 6–8.5)
RBC # BLD AUTO: 4.56 X10E6/UL (ref 3.77–5.28)
SODIUM SERPL-SCNC: 140 MMOL/L (ref 134–144)
TIBC SERPL-MCNC: 303 UG/DL (ref 250–450)
UIBC SERPL-MCNC: 233 UG/DL (ref 131–425)
VIT B12 SERPL-MCNC: 768 PG/ML (ref 232–1245)
WBC # BLD AUTO: 9.5 X10E3/UL (ref 3.4–10.8)

## 2024-01-12 ENCOUNTER — TELEPHONE (OUTPATIENT)
Dept: GASTROENTEROLOGY | Facility: CLINIC | Age: 44
End: 2024-01-12
Payer: COMMERCIAL

## 2024-01-12 NOTE — TELEPHONE ENCOUNTER
NO PERSONAL HX OF POLYPS    NO FAMILY HX OF POLYPS    NO FAMILY HX OF COLON CA    NO ASA OR BLOOD THINNERS        LIST OF  MEDICATIONS    WELLBUTRIN   ZYRTEC  REMICADE  ALEVE      OA QUESTIONNAIRE SCANNED IN MEDIA

## 2024-01-18 ENCOUNTER — TELEMEDICINE (OUTPATIENT)
Dept: FAMILY MEDICINE CLINIC | Facility: CLINIC | Age: 44
End: 2024-01-18
Payer: COMMERCIAL

## 2024-01-18 DIAGNOSIS — F43.23 ADJUSTMENT DISORDER WITH MIXED ANXIETY AND DEPRESSED MOOD: ICD-10-CM

## 2024-01-18 PROCEDURE — 99213 OFFICE O/P EST LOW 20 MIN: CPT | Performed by: PHYSICIAN ASSISTANT

## 2024-01-18 RX ORDER — DIAZEPAM 2 MG/1
TABLET ORAL
COMMUNITY
Start: 2023-09-21

## 2024-01-18 RX ORDER — BUPROPION HYDROCHLORIDE 150 MG/1
150 TABLET ORAL 2 TIMES DAILY
Qty: 60 TABLET | Refills: 0 | Status: SHIPPED | OUTPATIENT
Start: 2024-01-18

## 2024-01-18 RX ORDER — HYDROCODONE BITARTRATE AND ACETAMINOPHEN 5; 325 MG/1; MG/1
TABLET ORAL
COMMUNITY
Start: 2023-09-21

## 2024-01-18 NOTE — PROGRESS NOTES
Subjective   Sandra Pickard is a 43 y.o. female who is being evaluated by video visit today in follow up of moods with hypoglycemia, vitamin D deficiency, elevated iron, hematuria, elevated bilirubin, Crohn's disease with immunosuppression, psoriasis vs atopic dermatitis, suppurative hidradenitis, acne, and specialists.     HPI  You have chosen to receive care through a telehealth visit.  Do you consent to use a video/audio connection for your medical care today? Yes I completed visit from my home in a private and secure location. She completed the visit from her car. No other participants.      No other new concerns.     Depression and anxiety- Wellbutrin- thinks helped some- anxious more in the evening than in the day- feels like it maybe wears off. Depends on the day. She continues with symptoms. Feels like moods are still all over the place and no significant improvement other than maybe some improvement in anxiety.  She wanted to start Wellbutrin.    She had frequently with divorce but not since. Has been anxious a couple times but not until that point until recently.    2023- Panic attack a couple weeks prior-  and was at a game she was not playing at and son had to drive her home. She was shaky- thought she needed to eat, chest heaviness, felt weak and tired, felt cold.   Worry about kids with license and paying insurance, uncle  of pancreatic cancer yesterday. Mother with metastatic breast cancer. Mother seems ok- dx  with breast cancer and they did lumpectomy. 1 year later, they repeat scans and mets to lung- 5 L fluid on it. No longer needing to drain her lung but now in liver. On chemo and talking about changes. mothr with PalV2 mutation- patient has been checked and is negative. Sister and Aunt are positive. MGM negative.   Working at [employer] in the week and .   Daily not feeling depressed or anxious.   Previous medications- Zoloft- made a shell of human being- no  emotion- not able to cry or laugh. Wellbutrin- worked at the time but thinks she had to get through what she was going through. No AE.      From 12/2023-   Previous 279 and got down to 189 lbs. Weight Watchers. She has gained some. That helped symptoms. Thinks has gained 20 lbs.  Nonfasting.   Hypoglycemia- Eats breakfast- thinks had sausage biscuit and a coke before going to get labs and glucose was 64.   Has had low blood sugar a couple times with labs.   Never had elevated blood sugar. 6 weeks prior to labs in November was the 1st time she noticed.   Did not feel bad when she checked. No episodes of sweating, weakness, dizziness, change in mental status, or syncopal/ near syncopal episodes.   Vitamin D deficiency- taking vitamin D 5000 IU daily.   Anemia, elevated iron- thinks was better- no trouble that she is aware. Elevated iron 7/2023  Required transfusions [id number] when dx Crohn's.   Menses- getting worse- some months that she has to change every 15 minutes. On an average month- 1st or 2nd day, changes every few hours and is normal. Last about 4-5 days. The 1st severe period in 2020 and may have had 2-3 since then. They are over quickly though. She does not seem to have multiple days of that- either stops or very light. No further blood in stool.   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 Was not on menses then but is now.   Elevated bilirubin- had been normal prior to labs 7/023 but elevated 7/2023.     Crohn's disease- not seen GI since 2020. Remicade every 6 weeks. Thinking she may need to change. Has to change because infusion nurse is leaving. They are likely setting    Cholelithiasis s/p cholecystectomy-   2020- found gallstones and referred to surgeon. She was seen and was not symptomatic. They advised she could wait. Then got sick in 11/2022. Thought she had stomach bug. She went to ER.   Patient admitted to the hospital 11/13/2022 through 11/14/2022 for lap ana for  acute cholecystitis.  She was seen in follow-up 11/29/2022 and advised they would expect bloating to improve after a few weeks to a month and follow-up if no improvement or worsening.    FHx arrhythmia- Mother had to have pacemaker/ AICD- she was passing out and had times with very slow rate. Placed at 48-49 years of age.     Psoriasis versus atopic dermatitis- not trouble with it. She is ok.   She reports she has always had psoriasis. No trouble right now. At one point lost [place] of hair. She has arthritis issues- not sure the cause but thinks it was from Crohn's. It has not been main focus.   Suppurative hidradenitis- had really bad in axilla and had a couple in groin but not as bad. Remicade and topical treatment as needed and occasionally. Weight loss helped a lot.   Acne- had in the past but not now.     Patient's specialists:  GI- JANICE Cao- procedure visit 11/2022 for Remicade for Crohn's disease.  General surgery- Dr. Zari Belcher-last appointment 11/29/2022 for 4 week follow-up from cholecystectomy for cholecystitis.  Follow-up as needed.    Previous PCP- 2015- JANICE Dan-seen for possible UTI, ear and nose problems.  She went to a dermatologist for some skin issues and told she had psoriasis in her right ear canal.  Sore on the right side of her nose that she has been treating with Neosporin.  She was treated with Omnicef 300 mg twice daily for 10 days for otitis serous media with effusion and possible UTI.  Referred to ENT for further evaluation.  Follow-up as needed.  2014- seen for CPE with Pap.    The following portions of the patient's history were reviewed and updated as appropriate: allergies, current medications, past family history, past medical history, past social history, past surgical history and problem list.    Review of Systems    Objective   There were no vitals filed for this visit.    Class 2 Severe Obesity (BMI >=35 and <=39.9). Obesity-related health conditions  include the following: none. Obesity is worsening. BMI is is above average; BMI management plan is completed. We discussed portion control and increasing exercise.    Physical Exam  Constitutional:       General: She is not in acute distress.     Appearance: She is well-developed.   HENT:      Head: Normocephalic and atraumatic.   Eyes:      General:         Right eye: No discharge.         Left eye: No discharge.   Pulmonary:      Effort: Pulmonary effort is normal. No respiratory distress.   Skin:     General: Skin is dry.   Psychiatric:         Attention and Perception: She is attentive.         Mood and Affect: Mood normal.         Speech: Speech normal.         Behavior: Behavior normal.         Thought Content: Thought content normal.         Judgment: Judgment normal.         Assessment & Plan   Diagnoses and all orders for this visit:    1. Adjustment disorder with mixed anxiety and depressed mood  -     buPROPion XL (Wellbutrin XL) 150 MG 24 hr tablet; Take 1 tablet by mouth 2 (Two) Times a Day.  Dispense: 60 tablet; Refill: 0      Assessment and Plan  Follow up in 1 month in person or by video visit for re-evaluation of moods.    Adjustment reaction with depression and anxiety- Patient with worsening moods. I restarted Wellbutrin  mg daily, however, she has had only minimal improvement with wearing off in the evening. She will increase to Wellbutrin  mg once daily or 150 mg twice daily. If AE with medication or no improvement, we will decrease back to 150 mg once daily and add Lexapro 5 mg daily. She will let me know by calling or sending [facility] message if she needs to change medication. To be seen if worsening moods or AE with medication. Otherwise, to follow up in 1 month for re-evaluation of moods.    From 12/2023-   Hypoglycemia- Patient should avoid refined sugars- stop sodas, juices, sweet tea, gatorade, sugary drinks, alcohol, sweets, sugars, and decrease starches- pasta, potatoes,  bread.    Vitamin D deficiency- Vitamin D was ok with last labs. Continue vitamin D 5000 IU daily.   Anemia, elevated iron- Iron was ok with last labs. I will continue to monitor and make recommendations.   Hematuria- She reports blood in urine when not on menses but on menses now. We will need to re-evaluate for blood when not on menses and make further recommendations pending recheck.   Elevated bilirubin- I will recheck and make further recommendations.   Crohn's disease- She needs to see GI in follow up. She continues Remicade but is not following with GI. She needs to schedule appt with provider for follow up.     Cholelithiasis s/p cholecystectomy- If persistently elevated bilirubin, we will consider repeat imaging. She should be seen if any GI symptoms.    FHx arrhythmia- We will need to consider cardiology workup to ensure symptoms of panic attack are not from arrhythmia if patient has any recurrence of symptoms.   Psoriasis versus atopic dermatitis- Patient will need to see dermatology if worsening, new, or changing symptoms or uncontrolled dermatitis.   Suppurative hidradenitis- She has had improvement with weight loss and Remicade. We will consider dermatology if uncontrolled symptoms.   Acne- Acne improved. To see dermatology if uncontrolled.     I spent 20 minutes for video visit for Sandra Pickard on this date of service. This time includes time spent by me in the following activities as necessary: preparing for the visit, reviewing tests, specialists records and previous visits, obtaining and/or reviewing a separately obtained history, counseling and educating the patient, referring and/or communicating with other healthcare professionals, documenting information in the medical record, independently interpreting results and communicating that information with the patient, family, caregiver, and developing a medically appropriate treatment plan with consideration of other conditions, medications, and  treatments.          Answers submitted by the patient for this visit:  Other (Submitted on 1/18/2024)  Please describe your symptoms.: Follow up appointment for new med, bupropion.  Have you had these symptoms before?: No  How long have you been having these symptoms?: Greater than 2 weeks  Primary Reason for Visit (Submitted on 1/18/2024)  What is the primary reason for your visit?: Other

## 2024-01-19 ENCOUNTER — PATIENT ROUNDING (BHMG ONLY) (OUTPATIENT)
Dept: FAMILY MEDICINE CLINIC | Facility: CLINIC | Age: 44
End: 2024-01-19
Payer: COMMERCIAL

## 2024-01-19 NOTE — PROGRESS NOTES
"My name is Efren Grajeda     I am the Practice Manager with   [facility] [address] [address] [phone]      I am messaging to ask you about our practice and your recent visit.     Tell me about your visit with us. What things went well?         We're always looking for ways to make our patients' experiences even better. Do you have recommendations on ways we may improve?       Overall were you satisfied with your first visit to our practice?        Is there anything else I can do for you?     Also, please note that you may receive a survey from \"[employer]\" please take time to fill that out, as it provides important feedback for our office.       Thank you, and have a great day.   "

## 2024-01-29 ENCOUNTER — TELEPHONE (OUTPATIENT)
Dept: GASTROENTEROLOGY | Facility: CLINIC | Age: 44
End: 2024-01-29
Payer: COMMERCIAL

## 2024-01-29 NOTE — TELEPHONE ENCOUNTER
Hub staff attempted to follow warm transfer process and was unsuccessful     Caller: Sandra Pickard    Relationship to patient: Self    Best call back number: [phone]     Patient is needing: RETURNING [facility]'S CALL TO SCHEDULE.

## 2024-01-29 NOTE — TELEPHONE ENCOUNTER
Hub staff attempted to follow warm transfer process and was unsuccessful     Caller: Sandra Pickard    Relationship to patient: Self    Best call back number: [phone]     Patient is needing: RETURNING [facility]'S CALL TO SCHEDULE.

## 2024-02-15 ENCOUNTER — PATIENT MESSAGE (OUTPATIENT)
Dept: FAMILY MEDICINE CLINIC | Facility: CLINIC | Age: 44
End: 2024-02-15
Payer: COMMERCIAL

## 2024-02-15 DIAGNOSIS — F43.23 ADJUSTMENT DISORDER WITH MIXED ANXIETY AND DEPRESSED MOOD: ICD-10-CM

## 2024-02-15 RX ORDER — BUPROPION HYDROCHLORIDE 150 MG/1
150 TABLET ORAL 2 TIMES DAILY
Qty: 60 TABLET | Refills: 0 | Status: CANCELLED | OUTPATIENT
Start: 2024-02-15

## 2024-02-15 RX ORDER — BUPROPION HYDROCHLORIDE 150 MG/1
150 TABLET ORAL 2 TIMES DAILY
Qty: 60 TABLET | Refills: 0 | Status: SHIPPED | OUTPATIENT
Start: 2024-02-15

## 2024-03-11 ENCOUNTER — TELEMEDICINE (OUTPATIENT)
Dept: FAMILY MEDICINE CLINIC | Facility: CLINIC | Age: 44
End: 2024-03-11
Payer: COMMERCIAL

## 2024-03-11 DIAGNOSIS — F43.23 ADJUSTMENT DISORDER WITH MIXED ANXIETY AND DEPRESSED MOOD: ICD-10-CM

## 2024-03-11 PROCEDURE — 99213 OFFICE O/P EST LOW 20 MIN: CPT | Performed by: PHYSICIAN ASSISTANT

## 2024-03-11 RX ORDER — BUPROPION HYDROCHLORIDE 300 MG/1
300 TABLET ORAL EVERY MORNING
Qty: 90 TABLET | Refills: 1 | Status: SHIPPED | OUTPATIENT
Start: 2024-03-11

## 2024-03-11 NOTE — PROGRESS NOTES
Subjective   Sandra Pickard is a 43 y.o. female who is being evaluated by video visit today in follow up of moods with hypoglycemia, vitamin D deficiency, elevated iron, hematuria, elevated bilirubin, Crohn's disease with immunosuppression, psoriasis vs atopic dermatitis, suppurative hidradenitis, acne, and specialists.     HPI  You have chosen to receive care through a telehealth visit.  Do you consent to use a video/audio connection for your medical care today? Yes I completed visit from my home in a private and secure location. She completed the visit from her car. No other participants.      No other new concerns.       Depression and anxiety- Taking both in the morning. She has no side effects. Some days- feels stomach being upset but is less severe and less often. No worsening.   2024-she thought Wellbutrin helped some- anxious more in the evening than in the day and felt like it was wearing off depending on the day. She continued with symptoms and felt like moods were still all over the place and no significant improvement other than maybe some improvement in anxiety.  Prior, she wanted to start Wellbutrin.    She had frequently with divorce but not since. Has been anxious a couple times but not until that point until recently.    2023- Panic attack a couple weeks prior-  and was at a game she was not playing at and son had to drive her home. She was shaky- thought she needed to eat, chest heaviness, felt weak and tired, felt cold.   Worry about kids with license and paying insurance, uncle  of pancreatic cancer yesterday. Mother with metastatic breast cancer. Mother seems ok- dx  with breast cancer and they did lumpectomy. 1 year later, they repeat scans and mets to lung- 5 L fluid on it. No longer needing to drain her lung but now in liver. On chemo and talking about changes. mothr with PalV2 mutation- patient has been checked and is negative. Sister and Aunt are positive. MGM  negative.   Working at [employer] in the week and .   Daily not feeling depressed or anxious.   Previous medications- Zoloft- made a shell of human being- no emotion- not able to cry or laugh. Wellbutrin- worked at the time but thinks she had to get through what she was going through. No AE.    From 12/2023-   Previous 279 and got down to 189 lbs. Weight Watchers. She has gained some. That helped symptoms. Thinks has gained 20 lbs.  Nonfasting.   Hypoglycemia- Eats breakfast- thinks had sausage biscuit and a coke before going to get labs and glucose was 64.   Has had low blood sugar a couple times with labs.   Never had elevated blood sugar. 6 weeks prior to labs in November was the 1st time she noticed.   Did not feel bad when she checked. No episodes of sweating, weakness, dizziness, change in mental status, or syncopal/ near syncopal episodes.   Vitamin D deficiency- taking vitamin D 5000 IU daily.   Anemia, elevated iron- thinks was better- no trouble that she is aware. Elevated iron 7/2023  Required transfusions [id number] when dx Crohn's.   Menses- getting worse- some months that she has to change every 15 minutes. On an average month- 1st or 2nd day, changes every few hours and is normal. Last about 4-5 days. The 1st severe period in 2020 and may have had 2-3 since then. They are over quickly though. She does not seem to have multiple days of that- either stops or very light. No further blood in stool.   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 Was not on menses then but is now.   Elevated bilirubin- had been normal prior to labs 7/023 but elevated 7/2023.     Crohn's disease- not seen GI since 2020. Remicade every 6 weeks. Thinking she may need to change. Has to change because infusion nurse is leaving. They are likely setting    Cholelithiasis s/p cholecystectomy-   2020- found gallstones and referred to surgeon. She was seen and was not symptomatic. They advised  she could wait. Then got sick in 11/2022. Thought she had stomach bug. She went to ER.   Patient admitted to the hospital 11/13/2022 through 11/14/2022 for lap ana for acute cholecystitis.  She was seen in follow-up 11/29/2022 and advised they would expect bloating to improve after a few weeks to a month and follow-up if no improvement or worsening.    FHx arrhythmia- Mother had to have pacemaker/ AICD- she was passing out and had times with very slow rate. Placed at 48-49 years of age.     Psoriasis versus atopic dermatitis- not trouble with it. She is ok.   She reports she has always had psoriasis. No trouble right now. At one point lost [place] of hair. She has arthritis issues- not sure the cause but thinks it was from Crohn's. It has not been main focus.   Suppurative hidradenitis- had really bad in axilla and had a couple in groin but not as bad. Remicade and topical treatment as needed and occasionally. Weight loss helped a lot.   Acne- had in the past but not now.     Patient's specialists:  GI- JANICE Cao- procedure visit 11/2022 for Remicade for Crohn's disease.  General surgery- Dr. Zari Belcher-last appointment 11/29/2022 for 4 week follow-up from cholecystectomy for cholecystitis.  Follow-up as needed.    Previous PCP- 2015- JANICE Dan-seen for possible UTI, ear and nose problems.  She went to a dermatologist for some skin issues and told she had psoriasis in her right ear canal.  Sore on the right side of her nose that she has been treating with Neosporin.  She was treated with Omnicef 300 mg twice daily for 10 days for otitis serous media with effusion and possible UTI.  Referred to ENT for further evaluation.  Follow-up as needed.  2014- seen for CPE with Pap.    The following portions of the patient's history were reviewed and updated as appropriate: allergies, current medications, past family history, past medical history, past social history, past surgical history and problem  list.    Review of Systems    Objective   There were no vitals filed for this visit.    Class 2 Severe Obesity (BMI >=35 and <=39.9). Obesity-related health conditions include the following: none. Obesity is worsening. BMI is is above average; BMI management plan is completed. We discussed portion control and increasing exercise.    Physical Exam  Constitutional:       General: She is not in acute distress.     Appearance: She is well-developed.   HENT:      Head: Normocephalic and atraumatic.   Eyes:      General:         Right eye: No discharge.         Left eye: No discharge.   Pulmonary:      Effort: Pulmonary effort is normal. No respiratory distress.   Skin:     General: Skin is dry.   Psychiatric:         Attention and Perception: She is attentive.         Mood and Affect: Mood normal.         Speech: Speech normal.         Behavior: Behavior normal.         Thought Content: Thought content normal.         Assessment & Plan   Diagnoses and all orders for this visit:    1. Adjustment disorder with mixed anxiety and depressed mood  -     buPROPion XL (Wellbutrin XL) 300 MG 24 hr tablet; Take 1 tablet by mouth Every Morning.  Dispense: 90 tablet; Refill: 1        Assessment and Plan  Follow up in July for reevaluation of moods and follow-up with fasting labs and office.    Adjustment reaction with depression and anxiety- Patient with worsening moods. I restarted Wellbutrin  mg daily, however, she only had minimal improvement with wearing off in the evening. She increased Wellbutrin XL to 300 mg once daily and has had significant improvement in moods.  She may have some days that she has more GI symptoms or mood symptoms that are more mild and less frequent.  She overall feels she is on the correct dose.  Continue Wellbutrin  mg daily.  If AE with medication or no improvement, we will decrease back to 150 mg once daily and add Lexapro 5 mg daily. She will let me know by calling or sending [employer]  message if she needs to change medication. To be seen if worsening moods or AE with medication. Otherwise, to follow up in July 2024 for re-evaluation of moods.    From 12/2023-   Hypoglycemia- Patient should avoid refined sugars- stop sodas, juices, sweet tea, gatorade, sugary drinks, alcohol, sweets, sugars, and decrease starches- pasta, potatoes, bread.    Vitamin D deficiency- Vitamin D was ok with last labs. Continue vitamin D 5000 IU daily.   Anemia, elevated iron- Iron was ok with last labs. I will continue to monitor and make recommendations.   Hematuria- She reports blood in urine when not on menses but on menses now. We will need to re-evaluate for blood when not on menses and make further recommendations pending recheck.   Elevated bilirubin- I will recheck and make further recommendations.   Crohn's disease- She needs to see GI in follow up. She continues Remicade but is not following with GI. She needs to schedule appt with provider for follow up.     Cholelithiasis s/p cholecystectomy- If persistently elevated bilirubin, we will consider repeat imaging. She should be seen if any GI symptoms.    FHx arrhythmia- We will need to consider cardiology workup to ensure symptoms of panic attack are not from arrhythmia if patient has any recurrence of symptoms.   Psoriasis versus atopic dermatitis- Patient will need to see dermatology if worsening, new, or changing symptoms or uncontrolled dermatitis.   Suppurative hidradenitis- She has had improvement with weight loss and Remicade. We will consider dermatology if uncontrolled symptoms.   Acne- Acne improved. To see dermatology if uncontrolled.     I spent 20 minutes for video visit for Sandra Pickard on this date of service. This time includes time spent by me in the following activities as necessary: preparing for the visit, reviewing tests, specialists records and previous visits, obtaining and/or reviewing a separately obtained history, counseling and  educating the patient, referring and/or communicating with other healthcare professionals, documenting information in the medical record, independently interpreting results and communicating that information with the patient, family, caregiver, and developing a medically appropriate treatment plan with consideration of other conditions, medications, and treatments.

## 2024-04-15 ENCOUNTER — OFFICE VISIT (OUTPATIENT)
Dept: GASTROENTEROLOGY | Facility: CLINIC | Age: 44
End: 2024-04-15
Payer: COMMERCIAL

## 2024-04-15 ENCOUNTER — PREP FOR SURGERY (OUTPATIENT)
Dept: SURGERY | Facility: SURGERY CENTER | Age: 44
End: 2024-04-15
Payer: COMMERCIAL

## 2024-04-15 VITALS
HEIGHT: 65 IN | BODY MASS INDEX: 35.74 KG/M2 | HEART RATE: 82 BPM | OXYGEN SATURATION: 97 % | DIASTOLIC BLOOD PRESSURE: 70 MMHG | SYSTOLIC BLOOD PRESSURE: 110 MMHG | WEIGHT: 214.5 LBS | TEMPERATURE: 96.8 F

## 2024-04-15 DIAGNOSIS — K50.00 CROHN'S DISEASE INVOLVING TERMINAL ILEUM: Primary | ICD-10-CM

## 2024-04-15 DIAGNOSIS — R12 HEARTBURN: ICD-10-CM

## 2024-04-15 DIAGNOSIS — Z79.620 INFLIXIMAB (REMICADE) LONG-TERM USE: ICD-10-CM

## 2024-04-15 DIAGNOSIS — Z87.2 HISTORY OF ERYTHEMA NODOSUM: ICD-10-CM

## 2024-04-15 RX ORDER — SODIUM CHLORIDE, SODIUM LACTATE, POTASSIUM CHLORIDE, CALCIUM CHLORIDE 600; 310; 30; 20 MG/100ML; MG/100ML; MG/100ML; MG/100ML
30 INJECTION, SOLUTION INTRAVENOUS CONTINUOUS PRN
OUTPATIENT
Start: 2024-04-15

## 2024-04-15 RX ORDER — SODIUM CHLORIDE 0.9 % (FLUSH) 0.9 %
3 SYRINGE (ML) INJECTION EVERY 12 HOURS SCHEDULED
OUTPATIENT
Start: 2024-04-15

## 2024-04-15 RX ORDER — SODIUM CHLORIDE 0.9 % (FLUSH) 0.9 %
10 SYRINGE (ML) INJECTION AS NEEDED
OUTPATIENT
Start: 2024-04-15

## 2024-04-15 NOTE — PROGRESS NOTES
"Chief Complaint   Patient presents with    Crohn's Disease     Establish care           History of Present Illness  43-year-old female presents today to establish care with our office for diagnosis of Crohn's disease.  Diagnosed with Crohn's disease of the ileum diagnosed in 2012.  She continues on infliximab infusions every 6 weeks 5 mg/kg.  She has been following with [facility] gastroenterology in [facility].  She is status postcholecystectomy November 2022.    Last colonoscopy at least greater than five years ago.   No previous upper scope.     She is on Remicade 5 mg/kg every 6 weeks for as long as she can remember. .  Infliximab infusions were initiated in 2013.   Last infusion was  She is premedicated with Tylenol 650 mg and Claritin 10 mg at the time of her infusion.    She has a history of erythema nodosum, joint pain, HS.  She has not needed to follow with dermatology recently.    Therapeutic drug monitoring, infliximab serum concentration August 17, 2023 was 4.9.  Antibody 96.    She will experience some diarrhea and urgency after eating at times, possible related to certain food triggers, otherwise three BM per day.   No nocturnal bowel movements.     Rare bright blood on toilet tissue only.     She will take pepcid as needed for acid reflux.     She is overdue for GYN evaluation, she is requesting referral.   Does not follow with dermatology.     Previous treatments for Crohn's disease include imuran and prednisone.   She is scheduled for her next infliximab infusion May 20, 2024 at [address] [place] [address].    No family history of colon cancer or polyps.     Result Review :    Hepatitis B surface antigen (11/08/2023 09:40)  Vitamin B12 & Folate (12/15/2023 15:43)           LABS SCANNED (11/08/2023)    Vital Signs:   /70   Pulse 82   Temp 96.8 °F (36 °C)   Ht 165.1 cm (65\")   Wt 97.3 kg (214 lb 8 oz)   SpO2 97%   BMI 35.69 kg/m²     Body mass index is 35.69 kg/m².   "   Physical Exam  Vitals reviewed.   Constitutional:       General: She is not in acute distress.     Appearance: Normal appearance. She is not ill-appearing or toxic-appearing.   Eyes:      General: No scleral icterus.  Pulmonary:      Effort: Pulmonary effort is normal. No respiratory distress.   Skin:     Coloration: Skin is not jaundiced.      Comments: Left anterior ankle erythema but nontender, not raised, no drainage.    Neurological:      Mental Status: She is alert and oriented to person, place, and time.   Psychiatric:         Mood and Affect: Mood normal.         Behavior: Behavior normal.         Thought Content: Thought content normal.         Judgment: Judgment normal.           Assessment and Plan    Diagnoses and all orders for this visit:    1. Crohn's disease involving terminal ileum (Primary)    2. Infliximab (Remicade) long-term use    3. History of erythema nodosum       Crohn's disease of the ileum, diagnosed in 2012, history of erythema nodosum, hidradenitis suppurativa, joint pain.    No previous surgical resection related to inflammatory bowel disease, she presents today to establish care with our office, she has been following with [facility] gastroenterology but has moved and is establishing care in [place], she is related to gastroenterology nurse practitioner at [facility].    Longstanding Crohn's of the ileum, due for EGD and colonoscopy, orders placed.    On infliximab 5 mg/kg every 6 weeks, due for next infusion May 20, 2024, will leave this scheduled with her previous gastroenterologist, we will take over future infusions, patient is premedicated with Claritin and Tylenol prior to infusions.    Will obtain recent T spot, November 2023 from previous gastroenterology office for review and update chart accordingly.    Continue Pepcid as needed for acid reflux.    Will reach out to primary care provider for GYN referral and place referral accordingly.    Recommend annual follow-up with  dermatology for annual skin exams.    Recommend follow-up visit 2 weeks after EGD and colonoscopy have been performed to review results and continued monitoring.    Blood work up-to-date December 2024, will update blood work at follow-up visit unless she has had blood work sooner.    Please contact the office with any questions or concerns or if we can be of any additional assistance during the above workup    I spent 40 minutes caring for Sandra on this date of service. This time includes time spent by me in the following activities:preparing for the visit, reviewing tests, counseling and educating the patient/family/caregiver, ordering medications, tests, or procedures, referring and communicating with other health care professionals , and documenting information in the medical record      Patient Instructions   Crohn's disease of the ileum, diagnosed in 2012, due for EGD and colonoscopy, orders placed.    On infliximab 5 mg/kg every 6 weeks, due for next infusion May 20, 2024, will leave this scheduled with her previous gastroenterologist, we will take over future infusions, patient is premedicated with Claritin and Tylenol prior to infusions.    Will obtain recent T spot, November 2023 from previous gastroenterology office for review and update chart accordingly.    Continue Pepcid as needed for acid reflux.    Will reach out to primary care provider for GYN referral and place referral accordingly.    Recommend annual follow-up with dermatology for annual skin exams.    Recommend follow-up visit 2 weeks after EGD and colonoscopy have been performed to review results and continued monitoring.    Blood work up-to-date December 2024, will update blood work at follow-up visit unless she has had blood work sooner.    Please contact the office with any questions or concerns or if we can be of any additional assistance during the above workup        EMR Dragon/Transcription Disclaimer:  This document has been Dictated  utilizing Dragon dictation.

## 2024-04-15 NOTE — PATIENT INSTRUCTIONS
Crohn's disease of the ileum, diagnosed in 2012, due for EGD and colonoscopy, orders placed.    On infliximab 5 mg/kg every 6 weeks, due for next infusion May 20, 2024, will leave this scheduled with her previous gastroenterologist, we will take over future infusions, patient is premedicated with Claritin and Tylenol prior to infusions.    Will obtain recent T spot, November 2023 from previous gastroenterology office for review and update chart accordingly.    Continue Pepcid as needed for acid reflux.    Will reach out to primary care provider for GYN referral and place referral accordingly.    Recommend annual follow-up with dermatology for annual skin exams.    Recommend follow-up visit 2 weeks after EGD and colonoscopy have been performed to review results and continued monitoring.    Blood work up-to-date December 2024, will update blood work at follow-up visit unless she has had blood work sooner.    Please contact the office with any questions or concerns or if we can be of any additional assistance during the above workup

## 2024-05-28 ENCOUNTER — TELEPHONE (OUTPATIENT)
Dept: GASTROENTEROLOGY | Facility: CLINIC | Age: 44
End: 2024-05-28
Payer: COMMERCIAL

## 2024-05-28 NOTE — TELEPHONE ENCOUNTER
Caller: Sandra Pickard    Relationship to patient: Self    Best call back number: [phone]    Patient is needing: PATIENT IS NEEDING ASSISTANCE WITH REMICADE.  INSURANCE DENIED AND NEEDS TO SPEAK WITH SOMEONE.

## 2024-05-29 NOTE — TELEPHONE ENCOUNTER
Discussed w patient.  Working on PA.  Last infusion 4/4/2024 at [facility].  Tried & failed:  Imuran, Prednisone and Budesonide.  Pk  Sent referral to [facility]. albert

## 2024-08-26 ENCOUNTER — ANESTHESIA EVENT (OUTPATIENT)
Dept: SURGERY | Facility: SURGERY CENTER | Age: 44
End: 2024-08-26
Payer: COMMERCIAL

## 2024-08-26 ENCOUNTER — HOSPITAL ENCOUNTER (OUTPATIENT)
Facility: SURGERY CENTER | Age: 44
Setting detail: HOSPITAL OUTPATIENT SURGERY
Discharge: HOME | End: 2024-08-26
Attending: INTERNAL MEDICINE | Admitting: INTERNAL MEDICINE
Payer: COMMERCIAL

## 2024-08-26 ENCOUNTER — ANESTHESIA (OUTPATIENT)
Dept: SURGERY | Facility: SURGERY CENTER | Age: 44
End: 2024-08-26
Payer: COMMERCIAL

## 2024-08-26 VITALS
TEMPERATURE: 98.2 F | WEIGHT: 212 LBS | OXYGEN SATURATION: 99 % | HEART RATE: 67 BPM | RESPIRATION RATE: 16 BRPM | HEIGHT: 65 IN | DIASTOLIC BLOOD PRESSURE: 74 MMHG | SYSTOLIC BLOOD PRESSURE: 113 MMHG | BODY MASS INDEX: 35.32 KG/M2

## 2024-08-26 DIAGNOSIS — Z79.620 INFLIXIMAB (REMICADE) LONG-TERM USE: ICD-10-CM

## 2024-08-26 DIAGNOSIS — K50.00 CROHN'S DISEASE INVOLVING TERMINAL ILEUM: ICD-10-CM

## 2024-08-26 DIAGNOSIS — R12 HEARTBURN: ICD-10-CM

## 2024-08-26 LAB
B-HCG UR QL: NEGATIVE
EXPIRATION DATE: NORMAL
INTERNAL NEGATIVE CONTROL: NORMAL
INTERNAL POSITIVE CONTROL: NORMAL
Lab: NORMAL

## 2024-08-26 PROCEDURE — 43239 EGD BIOPSY SINGLE/MULTIPLE: CPT | Performed by: INTERNAL MEDICINE

## 2024-08-26 PROCEDURE — 25010000002 PROPOFOL 10 MG/ML EMULSION: Performed by: STUDENT IN AN ORGANIZED HEALTH CARE EDUCATION/TRAINING PROGRAM

## 2024-08-26 PROCEDURE — 45380 COLONOSCOPY AND BIOPSY: CPT | Performed by: INTERNAL MEDICINE

## 2024-08-26 PROCEDURE — 88305 TISSUE EXAM BY PATHOLOGIST: CPT | Performed by: INTERNAL MEDICINE

## 2024-08-26 PROCEDURE — 45385 COLONOSCOPY W/LESION REMOVAL: CPT | Performed by: INTERNAL MEDICINE

## 2024-08-26 PROCEDURE — 81025 URINE PREGNANCY TEST: CPT | Performed by: NURSE PRACTITIONER

## 2024-08-26 PROCEDURE — 88342 IMHCHEM/IMCYTCHM 1ST ANTB: CPT | Performed by: INTERNAL MEDICINE

## 2024-08-26 PROCEDURE — 25010000002 PROPOFOL 200 MG/20ML EMULSION: Performed by: STUDENT IN AN ORGANIZED HEALTH CARE EDUCATION/TRAINING PROGRAM

## 2024-08-26 PROCEDURE — 25810000003 LACTATED RINGERS PER 1000 ML: Performed by: STUDENT IN AN ORGANIZED HEALTH CARE EDUCATION/TRAINING PROGRAM

## 2024-08-26 PROCEDURE — 88341 IMHCHEM/IMCYTCHM EA ADD ANTB: CPT | Performed by: INTERNAL MEDICINE

## 2024-08-26 DEVICE — REPLAY HEMOSTASIS CLIP, 16MM SPAN
Type: IMPLANTABLE DEVICE | Site: COLON | Status: FUNCTIONAL
Brand: REPLAY

## 2024-08-26 RX ORDER — LIDOCAINE HYDROCHLORIDE 20 MG/ML
INJECTION, SOLUTION EPIDURAL; INFILTRATION; INTRACAUDAL; PERINEURAL AS NEEDED
Status: DISCONTINUED | OUTPATIENT
Start: 2024-08-26 | End: 2024-08-26 | Stop reason: SURG

## 2024-08-26 RX ORDER — FAMOTIDINE 10 MG/ML
20 INJECTION, SOLUTION INTRAVENOUS ONCE
Status: COMPLETED | OUTPATIENT
Start: 2024-08-26 | End: 2024-08-26

## 2024-08-26 RX ORDER — PROPOFOL 10 MG/ML
INJECTION, EMULSION INTRAVENOUS AS NEEDED
Status: DISCONTINUED | OUTPATIENT
Start: 2024-08-26 | End: 2024-08-26 | Stop reason: SURG

## 2024-08-26 RX ORDER — SODIUM CHLORIDE, SODIUM LACTATE, POTASSIUM CHLORIDE, CALCIUM CHLORIDE 600; 310; 30; 20 MG/100ML; MG/100ML; MG/100ML; MG/100ML
INJECTION, SOLUTION INTRAVENOUS CONTINUOUS PRN
Status: DISCONTINUED | OUTPATIENT
Start: 2024-08-26 | End: 2024-08-26 | Stop reason: SURG

## 2024-08-26 RX ORDER — SODIUM CHLORIDE 0.9 % (FLUSH) 0.9 %
3-10 SYRINGE (ML) INJECTION AS NEEDED
Status: DISCONTINUED | OUTPATIENT
Start: 2024-08-26 | End: 2024-08-26 | Stop reason: HOSPADM

## 2024-08-26 RX ORDER — SODIUM CHLORIDE 0.9 % (FLUSH) 0.9 %
3 SYRINGE (ML) INJECTION EVERY 12 HOURS SCHEDULED
Status: DISCONTINUED | OUTPATIENT
Start: 2024-08-26 | End: 2024-08-26 | Stop reason: HOSPADM

## 2024-08-26 RX ORDER — DEXTROSE MONOHYDRATE 25 G/50ML
12.5 INJECTION, SOLUTION INTRAVENOUS AS NEEDED
Status: DISCONTINUED | OUTPATIENT
Start: 2024-08-26 | End: 2024-08-26 | Stop reason: HOSPADM

## 2024-08-26 RX ORDER — SODIUM CHLORIDE, SODIUM LACTATE, POTASSIUM CHLORIDE, CALCIUM CHLORIDE 600; 310; 30; 20 MG/100ML; MG/100ML; MG/100ML; MG/100ML
9 INJECTION, SOLUTION INTRAVENOUS CONTINUOUS
Status: DISCONTINUED | OUTPATIENT
Start: 2024-08-26 | End: 2024-08-26 | Stop reason: HOSPADM

## 2024-08-26 RX ORDER — SODIUM CHLORIDE, SODIUM LACTATE, POTASSIUM CHLORIDE, CALCIUM CHLORIDE 600; 310; 30; 20 MG/100ML; MG/100ML; MG/100ML; MG/100ML
30 INJECTION, SOLUTION INTRAVENOUS CONTINUOUS PRN
Status: DISCONTINUED | OUTPATIENT
Start: 2024-08-26 | End: 2024-08-26 | Stop reason: HOSPADM

## 2024-08-26 RX ORDER — SODIUM CHLORIDE 0.9 % (FLUSH) 0.9 %
10 SYRINGE (ML) INJECTION AS NEEDED
Status: DISCONTINUED | OUTPATIENT
Start: 2024-08-26 | End: 2024-08-26 | Stop reason: HOSPADM

## 2024-08-26 RX ORDER — OMEPRAZOLE 40 MG/1
40 CAPSULE, DELAYED RELEASE ORAL DAILY
Qty: 30 CAPSULE | Refills: 5 | Status: SHIPPED | OUTPATIENT
Start: 2024-08-26

## 2024-08-26 RX ADMIN — SODIUM CHLORIDE, POTASSIUM CHLORIDE, SODIUM LACTATE AND CALCIUM CHLORIDE: 600; 310; 30; 20 INJECTION, SOLUTION INTRAVENOUS at 12:20

## 2024-08-26 RX ADMIN — LIDOCAINE HYDROCHLORIDE 100 MG: 20 INJECTION, SOLUTION EPIDURAL; INFILTRATION; INTRACAUDAL; PERINEURAL at 12:22

## 2024-08-26 RX ADMIN — FAMOTIDINE 20 MG: 10 INJECTION, SOLUTION INTRAVENOUS at 11:46

## 2024-08-26 RX ADMIN — SODIUM CHLORIDE, POTASSIUM CHLORIDE, SODIUM LACTATE AND CALCIUM CHLORIDE 9 ML/HR: 600; 310; 30; 20 INJECTION, SOLUTION INTRAVENOUS at 11:45

## 2024-08-26 RX ADMIN — PROPOFOL 100 MG: 10 INJECTION, EMULSION INTRAVENOUS at 12:23

## 2024-08-26 RX ADMIN — PROPOFOL 300 MCG/KG/MIN: 10 INJECTION, EMULSION INTRAVENOUS at 12:23

## 2024-08-26 NOTE — ANESTHESIA POSTPROCEDURE EVALUATION
Patient: Sandra Pickard    Procedure Summary       Date: 08/26/24 Room / Location: [facility]  / [facility]    Anesthesia Start: 1220 Anesthesia Stop: 1256    Procedures:       ESOPHAGOGASTRODUODENOSCOPY (EGD)      COLONOSCOPY FOR SCREENING to Cecum with Polypectomy Diagnosis:       Crohn's disease involving terminal ileum      Infliximab (Remicade) long-term use      Heartburn      (Crohn's disease involving terminal ileum [K50.00])      (Infliximab (Remicade) long-term use [Z79.620])      (Heartburn [R12])    Surgeons: Sourav Cuevas MD Provider: Dante Kan MD    Anesthesia Type: MAC ASA Status: 2            Anesthesia Type: MAC    Vitals  Vitals Value Taken Time   /51 08/26/24 1254   Temp 36.8 °C (98.2 °F) 08/26/24 1254   Pulse 79 08/26/24 1254   Resp 16 08/26/24 1254   SpO2 93 % 08/26/24 1254           Anesthesia Post Evaluation

## 2024-08-26 NOTE — ANESTHESIA PREPROCEDURE EVALUATION
Anesthesia Evaluation     Patient summary reviewed and Nursing notes reviewed   no history of anesthetic complications:   NPO Solid Status: > 8 hours  NPO Liquid Status: > 2 hours           Airway   Dental      Pulmonary    Cardiovascular         Neuro/Psych  GI/Hepatic/Renal/Endo    (+) obesity, GERD    Musculoskeletal     Abdominal   (+) obese   Substance History      OB/GYN          Other                    Anesthesia Plan    ASA 2     MAC     intravenous induction     Anesthetic plan, risks, benefits, and alternatives have been provided, discussed and informed consent has been obtained with: patient.    CODE STATUS:

## 2024-08-26 NOTE — H&P
No chief complaint on file.      HPI  Crohns  gerd         Problem List:    Patient Active Problem List   Diagnosis    Absolute anemia    Acne    Acute serous otitis media    Avitaminosis D    Blues    Breast lump    CD (contact dermatitis)    Dermatitis, eczematoid    Infection of urinary tract    Psoriasis    Colitis    Crohn's disease    Cholecystitis    Acute bronchitis    Arthralgia    Calculus of gallbladder    Diarrhea    Gastroesophageal reflux disease    Hidradenitis suppurativa    Psoriasis of scalp    Crohn's disease involving terminal ileum    Infliximab (Remicade) long-term use    Heartburn       Medical History:    Past Medical History:   Diagnosis Date    Cholelithiasis     Crohn's disease     GI (gastrointestinal bleed)     Crohn’s Disease    History of transfusion  & 2013    Due to Crohn’s disease relared blood loss.    Rectal bleeding     I was diagnosed with Crohn’s Disease        Social History:    Social History     Socioeconomic History    Marital status:    Tobacco Use    Smoking status: Never    Smokeless tobacco: Never   Vaping Use    Vaping status: Never Used   Substance and Sexual Activity    Alcohol use: Never    Drug use: Never    Sexual activity: Yes     Partners: Male     Birth control/protection: Vasectomy       Family History:   Family History   Problem Relation Age of Onset    Breast cancer Mother     Arthritis Mother     Cancer Mother         Metastatic breast Cancer    Arthritis Maternal Grandmother     Arthritis Paternal Grandmother     Malig Hyperthermia Neg Hx     Colon cancer Neg Hx     Colon polyps Neg Hx     Crohn's disease Neg Hx     Irritable bowel syndrome Neg Hx     Ulcerative colitis Neg Hx        Surgical History:   Past Surgical History:   Procedure Laterality Date     SECTION      CHOLECYSTECTOMY  2022    CHOLECYSTECTOMY WITH INTRAOPERATIVE CHOLANGIOGRAM N/A 2022    Procedure: CHOLECYSTECTOMY LAPAROSCOPIC INTRAOPERATIVE  CHOLANGIOGRAM;  Surgeon: Zari Belcher MD;  Location: [facility] OR;  Service: General;  Laterality: N/A;    COLONOSCOPY N/A 2013    [facility]       No current facility-administered medications for this encounter.    Current Outpatient Medications:     buPROPion XL (Wellbutrin XL) 300 MG 24 hr tablet, Take 1 tablet by mouth Every Morning., Disp: 90 tablet, Rfl: 1    cetirizine (zyrTEC) 5 MG tablet, Take 1 tablet by mouth Daily., Disp: , Rfl:     famotidine (PEPCID) 20 MG tablet, Take 1 tablet by mouth 2 (Two) Times a Day., Disp: , Rfl:     fluticasone (Flonase) 50 MCG/ACT nasal spray, 1 spray in each nostril bid for 7 days then daily (Patient taking differently: 1 spray into the nostril(s) as directed by provider Daily. 1 spray in each nostril bid for 7 days then daily), Disp: 1 bottle, Rfl: 1    inFLIXimab (REMICADE) 100 MG injection, Infuse  into a venous catheter. Every 6 weeks, Disp: , Rfl:     Allergies: No Known Allergies     The following portions of the patient's history were reviewed by me and updated as appropriate: review of systems, allergies, current medications, past family history, past medical history, past social history, past surgical history and problem list.    There were no vitals filed for this visit.    PHYSICAL EXAM:    CONSTITUTIONAL:  today's vital signs reviewed by me  GASTROINTESTINAL: abdomen is soft nontender nondistended with normal active bowel sounds, no masses are appreciated    Assessment/ Plan  Crohns  Gerd    Egd and colonoscopy    Risks and benefits as well as alternatives to endoscopic evaluation were explained to the patient and they voiced understanding and wish to proceed.  These risks include but are not limited to the risk of bleeding, perforation, adverse reaction to sedation, and missed lesions.  The patient was given the opportunity to ask questions prior to the endoscopic procedure.

## 2024-08-29 LAB
CYTO UR: NORMAL
LAB AP CASE REPORT: NORMAL
LAB AP DIAGNOSIS COMMENT: NORMAL
LAB AP SPECIAL STAINS: NORMAL
PATH REPORT.FINAL DX SPEC: NORMAL
PATH REPORT.GROSS SPEC: NORMAL

## 2024-10-15 ENCOUNTER — PATIENT MESSAGE (OUTPATIENT)
Dept: GASTROENTEROLOGY | Facility: CLINIC | Age: 44
End: 2024-10-15
Payer: COMMERCIAL

## 2024-12-02 ENCOUNTER — OFFICE VISIT (OUTPATIENT)
Dept: GASTROENTEROLOGY | Facility: CLINIC | Age: 44
End: 2024-12-02
Payer: COMMERCIAL

## 2024-12-02 VITALS
TEMPERATURE: 97.7 F | SYSTOLIC BLOOD PRESSURE: 116 MMHG | DIASTOLIC BLOOD PRESSURE: 84 MMHG | WEIGHT: 225.7 LBS | OXYGEN SATURATION: 96 % | HEART RATE: 82 BPM | HEIGHT: 65 IN | BODY MASS INDEX: 37.61 KG/M2

## 2024-12-02 DIAGNOSIS — R12 HEARTBURN: ICD-10-CM

## 2024-12-02 DIAGNOSIS — Z79.620 INFLIXIMAB (REMICADE) LONG-TERM USE: ICD-10-CM

## 2024-12-02 DIAGNOSIS — K50.00 CROHN'S DISEASE INVOLVING TERMINAL ILEUM: Primary | ICD-10-CM

## 2024-12-02 PROCEDURE — 99214 OFFICE O/P EST MOD 30 MIN: CPT | Performed by: NURSE PRACTITIONER

## 2024-12-02 RX ORDER — OMEPRAZOLE 40 MG/1
40 CAPSULE, DELAYED RELEASE ORAL DAILY
Qty: 90 CAPSULE | Refills: 1 | Status: SHIPPED | OUTPATIENT
Start: 2024-12-02

## 2024-12-02 NOTE — PATIENT INSTRUCTIONS
Reviewed recent EGD and colonoscopy    Mild gastritis and duodenitis, recommend lowest dose of PPI possible to control symptoms    Blood work due January 3 for remiacde monitoring and basic labs, orders placed    Continue remicade every 6 weeks at this time    Follow up July 2025    Magy will reach out tomorrow regarding infusions, cost etc    For GERD, follow antireflux precautions.  Recommend avoiding eating within 3 to 4 hours of bedtime.  Avoid foods that can trigger symptoms which may include citrus fruits, spicy foods, tomatoes and red sauces, chocolate, coffee/tea, caffeinated or carbonated beverages, alcohol. For acid reflux, our goal is to use lowest dose of acid medication possible to control symptoms in addition to dietary and lifestyle modifications for acid reflux.

## 2024-12-02 NOTE — PROGRESS NOTES
Chief Complaint   Patient presents with    Follow-up     recent EGD and Colonoscopy              GASTROENTEROLOGY SUMMARY  44-year-old female presents today for follow-up.  Last EGD and colonoscopy August 26, 2024  Initial consult April 15, 2024 for Crohn's disease.  She was initially diagnosed with Crohn's disease of the ileum in 2012.  She has a history of erythema nodosum, joint pain and hidradenitis suppurativa.  Infliximab was initiated 2013.  She continues on infliximab infusions 5 mg/kg every 6 weeks.  She is premedicated with Tylenol 650 mg and Claritin 10 mg with her infliximab infusions.    No family history of colon cancer, polyps or inflammatory bowel disease.  She has been following with [facility] in [facility].  She is status postcholecystectomy November 2022.    Previous treatments:   Imuran  Prednisone.    History of Present Illness  The patient is a 44-year-old female who presents today for a follow-up.    She received her last Remicade infusion on 11/27/2024 and continues to receive these infusions every 6 weeks without any complications.   Occasionally, she experiences changes in symptoms before or after the infusion, but they are not as severe as before.   She has been on this 6-week regimen for a significant period.   Her Remicade drug level was checked on 08/17/2023. She is scheduled for her next infusion on 01/08/2025.   She has been informed that if she discontinues Remicade, she may not be able to resume it.     She receives her infusions at [address] [place] [address], which is out of network, and is concerned about the cost. She is unsure if her insurance will cover the cost of the infusions.    She has not had any blood work done at her new location, unlike her previous location where it was done every few months.   She has not had a tuberculosis test.   She has experienced low blood sugar levels in several of her blood tests.   Her weight has been fluctuating, which she considers  "normal for her.   She has undergone an upper scope and colonoscopy.    She takes omeprazole daily and only experiences acid reflux when consuming spicy foods, certain sauces, or sugars.   She has 2 to 3 bowel movements daily and occasionally notices blood in her stool.   She reports no nausea or vomiting.    She has a history of erythema nodosum and hidradenitis suppurativa, but these conditions are not currently active.   She experiences joint pain before her infusions, but it is not as severe as it used to be.     Result Review :       Colonoscopy (08/26/2024 12:17)  Tissue Pathology Exam (08/26/2024 12:27)  Upper GI Endoscopy (08/26/2024 12:17)    Vital Signs:   /84   Pulse 82   Temp 97.7 °F (36.5 °C)   Ht 165.1 cm (65\")   Wt 102 kg (225 lb 11.2 oz)   SpO2 96%   BMI 37.56 kg/m²     Body mass index is 37.56 kg/m².     Physical Exam  Vitals reviewed.   Constitutional:       General: She is not in acute distress.     Appearance: Normal appearance. She is not ill-appearing or toxic-appearing.   Eyes:      General: No scleral icterus.  Pulmonary:      Effort: Pulmonary effort is normal. No respiratory distress.   Skin:     Coloration: Skin is not jaundiced.   Neurological:      Mental Status: She is alert and oriented to person, place, and time.   Psychiatric:         Mood and Affect: Mood normal.         Behavior: Behavior normal.         Thought Content: Thought content normal.         Judgment: Judgment normal.         Assessment and Plan        Diagnoses and all orders for this visit:    1. Crohn's disease involving terminal ileum (Primary)  -     Infliximab and Anti-Infliximab AB DoseASSURE IFX  -     CBC (No Diff)  -     Comprehensive Metabolic Panel  -     QuantiFERON TB Plus Client Incubated    2. Infliximab (Remicade) long-term use  -     Infliximab and Anti-Infliximab AB DoseASSURE IFX  -     CBC (No Diff)  -     Comprehensive Metabolic Panel  -     QuantiFERON TB Plus Client Incubated    3. " Heartburn  -     omeprazole (priLOSEC) 40 MG capsule; Take 1 capsule by mouth Daily.  Dispense: 90 capsule; Refill: 1       Reviewed recent EGD and colonoscopy  Colonoscopy August 26, 2024  Large polyp rectosigmoid colon, pedunculated consistent with hyperplastic polyp  Entire colon otherwise appeared normal, random colon biopsies with no active inflammation or granulomatous inflammation  EGD August 26, 2024  Mild erosions in the duodenum otherwise normal EGD, small bowel biopsies with minimally increased intraepithelial lymphocytes, no active inflammation  Random gastric biopsies with moderate chronic inflammation no H. pylori  Assessment & Plan  1. Crohn's Disease.  Her last Remicade infusion was on November 27, 2024, and she is scheduled for the next infusion on January 8, 2025.   She has been on Remicade every 6 weeks for a long time.   She reports occasional joint pain and fatigue before infusions, which improve immediately after the infusion. Blood work, including Remicade level, CBC, and liver and kidney function tests, will be conducted on January 3, 2025.   A QuantiFERON Gold test will also be performed. She is advised to continue omeprazole until January 1, 2025, after which it can be used as needed.   If her Remicade level is low and no significant antibodies are present, the dosage may be increased to 6 mg/kg while maintaining the 6-week interval.   A drug level check will be performed after 2 infusions.   A repeat colonoscopy is planned for August 2027.   The biologic nurse coordinator will contact her to discuss infusion options.    2. Gastritis and Duodenitis.  The upper endoscopy indicated mild gastritis and duodenitis, both related to acid reflux. Biopsies from the erosions in the duodenum were normal.   Despite being on omeprazole, she continues to experience symptoms triggered by certain foods.   She is advised to continue omeprazole until January 1, 2025, after which it can be used as needed.    She should avoid large meals and foods that trigger her symptoms.    3. Medication Management.  A prescription for omeprazole has been sent to [facility] for a 90-day supply.   She is advised to try taking it every other day or only as needed after January 1, 2025.    Follow-up  Return in 7 months for follow up.             Patient Instructions   Reviewed recent EGD and colonoscopy    Mild gastritis and duodenitis, recommend lowest dose of PPI possible to control symptoms    Blood work due January 3 for remiacde monitoring and basic labs, orders placed    Continue remicade every 6 weeks at this time    Follow up July 2025    Magy will reach out tomorrow regarding infusions, cost etc    For GERD, follow antireflux precautions.  Recommend avoiding eating within 3 to 4 hours of bedtime.  Avoid foods that can trigger symptoms which may include citrus fruits, spicy foods, tomatoes and red sauces, chocolate, coffee/tea, caffeinated or carbonated beverages, alcohol. For acid reflux, our goal is to use lowest dose of acid medication possible to control symptoms in addition to dietary and lifestyle modifications for acid reflux.               Patient or patient representative verbalized consent for the use of Ambient Listening during the visit with  JANICE Hernadez for chart documentation. 12/2/2024  09:45 EST    EMR Dragon/Transcription Disclaimer:  This document has been Dictated utilizing Dragon dictation.

## 2025-01-21 ENCOUNTER — PATIENT MESSAGE (OUTPATIENT)
Dept: GASTROENTEROLOGY | Facility: CLINIC | Age: 45
End: 2025-01-21
Payer: COMMERCIAL

## 2025-01-21 DIAGNOSIS — Z79.620 INFLIXIMAB (REMICADE) LONG-TERM USE: ICD-10-CM

## 2025-01-21 DIAGNOSIS — Z87.2 HISTORY OF ERYTHEMA NODOSUM: Primary | ICD-10-CM

## 2025-01-21 DIAGNOSIS — K50.00 CROHN'S DISEASE INVOLVING TERMINAL ILEUM: ICD-10-CM

## 2025-02-06 RX ORDER — PREDNISONE 10 MG/1
TABLET ORAL
Qty: 50 TABLET | Refills: 0 | Status: SHIPPED | OUTPATIENT
Start: 2025-02-06 | End: 2025-02-26

## 2025-02-17 ENCOUNTER — LAB (OUTPATIENT)
Dept: LAB | Facility: HOSPITAL | Age: 45
End: 2025-02-17
Payer: COMMERCIAL

## 2025-02-17 LAB
ALBUMIN SERPL-MCNC: 4 G/DL (ref 3.5–5.2)
ALBUMIN/GLOB SERPL: 1.1 G/DL
ALP SERPL-CCNC: 70 U/L (ref 39–117)
ALT SERPL W P-5'-P-CCNC: 16 U/L (ref 1–33)
ANION GAP SERPL CALCULATED.3IONS-SCNC: 7.7 MMOL/L (ref 5–15)
AST SERPL-CCNC: 17 U/L (ref 1–32)
BILIRUB SERPL-MCNC: 0.9 MG/DL (ref 0–1.2)
BUN SERPL-MCNC: 11 MG/DL (ref 6–20)
BUN/CREAT SERPL: 16.7 (ref 7–25)
CALCIUM SPEC-SCNC: 9.1 MG/DL (ref 8.6–10.5)
CHLORIDE SERPL-SCNC: 102 MMOL/L (ref 98–107)
CO2 SERPL-SCNC: 25.3 MMOL/L (ref 22–29)
CREAT SERPL-MCNC: 0.66 MG/DL (ref 0.57–1)
DEPRECATED RDW RBC AUTO: 44.1 FL (ref 37–54)
EGFRCR SERPLBLD CKD-EPI 2021: 111.8 ML/MIN/1.73
ERYTHROCYTE [DISTWIDTH] IN BLOOD BY AUTOMATED COUNT: 14.2 % (ref 12.3–15.4)
GLOBULIN UR ELPH-MCNC: 3.7 GM/DL
GLUCOSE SERPL-MCNC: 86 MG/DL (ref 65–99)
HCT VFR BLD AUTO: 40.1 % (ref 34–46.6)
HGB BLD-MCNC: 13.5 G/DL (ref 12–15.9)
MCH RBC QN AUTO: 29 PG (ref 26.6–33)
MCHC RBC AUTO-ENTMCNC: 33.7 G/DL (ref 31.5–35.7)
MCV RBC AUTO: 86.1 FL (ref 79–97)
PLATELET # BLD AUTO: 221 10*3/MM3 (ref 140–450)
PMV BLD AUTO: 9.4 FL (ref 6–12)
POTASSIUM SERPL-SCNC: 4 MMOL/L (ref 3.5–5.2)
PROT SERPL-MCNC: 7.7 G/DL (ref 6–8.5)
RBC # BLD AUTO: 4.66 10*6/MM3 (ref 3.77–5.28)
SODIUM SERPL-SCNC: 135 MMOL/L (ref 136–145)
WBC NRBC COR # BLD AUTO: 9.55 10*3/MM3 (ref 3.4–10.8)

## 2025-02-17 PROCEDURE — 85027 COMPLETE CBC AUTOMATED: CPT | Performed by: NURSE PRACTITIONER

## 2025-02-17 PROCEDURE — 86480 TB TEST CELL IMMUN MEASURE: CPT | Performed by: NURSE PRACTITIONER

## 2025-02-17 PROCEDURE — 80053 COMPREHEN METABOLIC PANEL: CPT | Performed by: NURSE PRACTITIONER

## 2025-02-19 LAB
GAMMA INTERFERON BACKGROUND BLD IA-ACNC: 0.02 IU/ML
M TB IFN-G BLD-IMP: NEGATIVE
M TB IFN-G CD4+ BCKGRND COR BLD-ACNC: 0.02 IU/ML
M TB IFN-G CD4+CD8+ BCKGRND COR BLD-ACNC: 0.02 IU/ML
MITOGEN IGNF BCKGRD COR BLD-ACNC: >10 IU/ML
SERVICE CMNT-IMP: NORMAL

## 2025-02-27 LAB
INFLIXIMAB AB SERPL-MCNC: 72 NG/ML
INFLIXIMAB SERPL-MCNC: 5.6 UG/ML

## 2025-03-04 ENCOUNTER — TELEPHONE (OUTPATIENT)
Dept: GASTROENTEROLOGY | Facility: CLINIC | Age: 45
End: 2025-03-04
Payer: COMMERCIAL

## 2025-03-04 NOTE — TELEPHONE ENCOUNTER
Hub staff attempted to follow warm transfer process and was unsuccessful    Caller: Sandra Pickard    Relationship to patient: Self    Best call back number: [phone]; [facility]    Chief complaint: WORK IN    Type of visit: FOLLOW UP    Requested date: TOMORROW [phone], REST OF WEEK IS OPEN.     If rescheduling, when is the original appointment: N/A     Additional notes: PATIENT'S SYMPTOMS WERE GETTING BETTER, BUT NOW IT HAS SPREAD TO HER FEET.

## 2025-03-05 NOTE — TELEPHONE ENCOUNTER
RN returned call to patient for more information. Pt reports that she has h/o erythema nodosum and thought the areas of concern had begun healing, however, they have reappeared on the ankle bone (both lateral and medial). Pt reports they are tender to touch and she has patchy, red areas that resemble broken blood vessels on both feet and lower legs. Please advise on best next step for patient. Thank you. EL

## 2025-03-06 ENCOUNTER — OFFICE VISIT (OUTPATIENT)
Dept: GASTROENTEROLOGY | Facility: CLINIC | Age: 45
End: 2025-03-06
Payer: COMMERCIAL

## 2025-03-06 VITALS
DIASTOLIC BLOOD PRESSURE: 72 MMHG | BODY MASS INDEX: 38.52 KG/M2 | WEIGHT: 231.2 LBS | TEMPERATURE: 98.2 F | HEIGHT: 65 IN | SYSTOLIC BLOOD PRESSURE: 116 MMHG | HEART RATE: 100 BPM | OXYGEN SATURATION: 97 %

## 2025-03-06 DIAGNOSIS — Z87.2 HISTORY OF HIDRADENITIS SUPPURATIVA: ICD-10-CM

## 2025-03-06 DIAGNOSIS — Z79.620 INFLIXIMAB (REMICADE) LONG-TERM USE: ICD-10-CM

## 2025-03-06 DIAGNOSIS — M07.60 ENTEROPATHIC ARTHRITIS ASSOCIATED WITH CROHN'S DISEASE: ICD-10-CM

## 2025-03-06 DIAGNOSIS — K50.90 ENTEROPATHIC ARTHRITIS ASSOCIATED WITH CROHN'S DISEASE: ICD-10-CM

## 2025-03-06 DIAGNOSIS — K29.30 CHRONIC SUPERFICIAL GASTRITIS WITHOUT BLEEDING: ICD-10-CM

## 2025-03-06 DIAGNOSIS — Z87.2 HISTORY OF ERYTHEMA NODOSUM: ICD-10-CM

## 2025-03-06 DIAGNOSIS — K50.018 CROHN'S DISEASE OF ILEUM WITH OTHER COMPLICATION: Primary | ICD-10-CM

## 2025-03-06 DIAGNOSIS — R23.9 RECENT SKIN CHANGES: ICD-10-CM

## 2025-03-06 PROCEDURE — 99215 OFFICE O/P EST HI 40 MIN: CPT | Performed by: NURSE PRACTITIONER

## 2025-03-06 NOTE — PROGRESS NOTES
Chief Complaint   Patient presents with    Skin changes     Crohn's disease, on Remicade             GASTROENTEROLOGY SUMMARY  44-year-old female presents today for work in appointment regarding skin changes.  Last visit February 2, 2024.    Last EGD and colonoscopy August 26, 2024  Colonoscopy with endoscopic and histologic quiescent Crohn's disease and hyperplastic polyp.  EGD with moderate gastritis, no H. pylori, normal small bowel biopsies.  Initial consult April 15, 2024 for Crohn's disease.  She was initially diagnosed with Crohn's disease of the ileum in 2012.  She has a history of erythema nodosum, joint pain and hidradenitis suppurativa.  No history of bowel surgery or resection.  She is status postcholecystectomy November 2022.    She continues on infliximab infusions 5 mg/kg every 6 weeks.  She is premedicated with Tylenol 650 mg and Claritin 10 mg with her infliximab infusions.  She is due for her next infusion April 9, 2025  Initiated 2013  Therapeutic drug monitoring was performed February 17, 2025 Infliximab serum concentration 5.6, antibody formation was 72.     No family history of colon cancer, polyps or inflammatory bowel disease.    Previous treatments for Crohn's disease:   Imuran  Prednisone.  Infliximab, initiated 2013-currently on infliximab 5 mg/kg every 6 weeks    History of Present Illness  The patient is a 44-year-old female who presents today for skin changes.    Inflammatory bowel disease with history of erythema nodosum with new bilateral lower extremity skin changes   She reports a history of erythema nodosum, characterized by a large, bruised-looking nodule that was occasionally painful.   In the past these nodules would resolve spontaneously, but she has since developed several new lesions, some of which are painful.   Starting January 2025 she has had a cluster of skin lesions on her ankle that come and go, no pain, no itching, feels different than erythema nodosum lesions in the  past.  She then developed less severe but similar appearing lesions on her other lower extremity and now is having small pinpoint flat blood clot looking skin changes around her toes bilaterally.    She was previously followed by a dermatologist in [place] who was familiar with skin changes and Crohn's disease.  Since she has moved to [place], she does not have a dermatologist.  They do not itch but are painful or tender at times.  She has been on Remicade 5 mg/kg every 6 weeks for an extended period, with the most recent infusion administered February 19, 2025.  She believes the Remicade infusion was been beneficial, as her skin lesions did improve following symptoms improved following its administration but did not completely resolve.  Contacted the office via [employer] and given her history of erythema nodosum and initial presenting lesion she was prescribed Medrol Dosepak with no improvement in symptoms and then was prescribed a 20-day taper of prednisone starting at 40 mg for 5 days tapering by 10 mg every 5 days with no improvement in symptoms.  - Onset: First observed in January 2025.  - Location: Bilateral lower extremities, feet, ankles and toes   - Duration: Since January 2025.  - Character: Large, bruised-looking nodule, new lesions that look like small scattered popped blood vessels, intermittent pain, no pruritus.  - Alleviating/Aggravating Factors: Improvement after her infliximab infusion February 19, 2025 but symptoms persist, prescribed prednisone and Medrol Dosepak January and early February 2025 with no improvement in skin lesions   - Severity: Some lesions are painful; joint pain and fatigue present.    Crohn's ileitis diagnosed in 2012, change in symptoms concerning for Crohn's flare  She experienced unusual discomfort and minor bleeding yesterday, along with mucus production, which she attributes to a potential flare-up of her Crohn's disease.   She acknowledges being under stress.  She also  "reports joint pain and fatigue.   She has experienced difficulty with intravenous access in the past.   She has been on Remicade 5 mg/kg every 6 weeks for an extended period, with the most recent infusion administered February 19, 2025.  - Onset: Yesterday.  - Character: Unusual discomfort, minor bleeding, mucus production.  - Alleviating/Aggravating Factors: Stress may be a contributing factor.        Result Review :         Infliximab and Anti-Infliximab AB DoseASSURE IFX (02/17/2025 10:18)  CBC (No Diff) (02/17/2025 10:18)  Comprehensive Metabolic Panel (02/17/2025 10:18)  QuantiFERON TB Plus Client Incubated (02/17/2025 10:18)  Serial Monitoring (02/17/2025 10:18)  Vital Signs:   /72   Pulse 100   Temp 98.2 °F (36.8 °C)   Ht 165.1 cm (65\")   Wt 105 kg (231 lb 3.2 oz)   SpO2 97%   BMI 38.47 kg/m²     Body mass index is 38.47 kg/m².     Physical Exam  Vitals reviewed.   Constitutional:       General: She is not in acute distress.     Appearance: Normal appearance. She is not ill-appearing or toxic-appearing.   Eyes:      General: No scleral icterus.  Pulmonary:      Effort: Pulmonary effort is normal. No respiratory distress.   Skin:     Coloration: Skin is not jaundiced.      Comments: Bilateral lower extremities, left greater than right, erythematous patch of pinpoint and slightly larger skin lesions noted above ankle and petechia noted at metatarsals and phalanges   Neurological:      Mental Status: She is alert and oriented to person, place, and time.   Psychiatric:         Mood and Affect: Mood normal.         Behavior: Behavior normal.         Thought Content: Thought content normal.         Judgment: Judgment normal.         Assessment and Plan        Diagnoses and all orders for this visit:    1. Crohn's disease of ileum with other complication (Primary)  -     Infliximab and Anti-Infliximab AB DoseASSURE IFX; Future    2. Recent skin changes    3. 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 History of erythema nodosum    5. Infliximab (Remicade) long-term use  -     Infliximab and Anti-Infliximab AB DoseASSURE IFX; Future    6. History of hidradenitis suppurativa    7. Chronic superficial gastritis without bleeding       Assessment & Plan  1.  Crohn's ileitis diagnosed in 2012 with history of extraintestinal manifestations including erythema nodosum, joint pain and new bilateral lower extremity skin lesions  -Reviewed recent therapeutic drug monitoring February 17, 2025, Remicade drug level currently at 5.6 (suboptimal)  - Goal: Maintain Remicade level of 7.5 or higher  - Next Remicade infusion scheduled for 04/09/2025  -Given worsening GI symptoms as well as joint pain and fatigue prior to Remicade infusions, low serum Remicade concentration on recent therapeutic drug monitoring and history of extraintestinal manifestations, recommend continuing Remicade infusions every 6 weeks, will increase dosage from 5 mg to 7.5 mg every 6 weeks, maintaining the same frequency  - Blood test to be conducted 5.5 weeks after increased dose (around 05/19/2025 or 05/20/2025)  - Referral to Dr. Connie Jaffe, dermatologist at [facility] in Dermatology, for further evaluation  - Advise contacting representative at Remicade infusion center regarding her concerns about Remicade infusions    2. Crohn's ileitis diagnosed in 2012 with extraintestinal manifestations  - Reports joint pain and fatigue prior to Remicade infusion (typically at 6-week macie)  - Recent episode of unusual pain, slight bleeding, and mucus discharge (possible flare)  -Low infliximab serum concentration on recent therapeutic drug monitoring February 17, 2025, recommend increasing Remicade dose to 7.5 mg every 6 weeks to manage symptoms more effectively  - Advise monitoring symptoms and reporting any significant changes    Follow-up  - Scheduled for follow-up visit on Thursday, 07/10/2025 at 9:30 AM       I spent 41 minutes caring for Sandra on  this date of service. This time includes time spent by me in the following activities:preparing for the visit, reviewing tests, performing a medically appropriate examination and/or evaluation , counseling and educating the patient/family/caregiver, ordering medications, tests, or procedures, referring and communicating with other health care professionals , and documenting information in the medical record      Patient Instructions   History of gastritis, continue omeprazole 40 mg daily    Reviewed recent therapeutic drug monitoring, will increase infliximab infusions to 7.5 mg/kg and continue at 6-week interval.    Will work on referral to local dermatologist, Dr. Connie Jaffe for skin changes and to establish care    Please contact infusion representative regarding your concerns as we discussed during today's visit    Follow-up visit scheduled July 2025    Recommend therapeutic drug monitoring for Remicade after you have received your first dose of 7.5 mg/kg at 6-week interval May 19 or May 20 depending on timing of your next Remicade infusion, if there is a delay in your Remicade infusion and it is not exactly at 6-week interval or is not for higher dose of Remicade, please contact the office and we will postpone Remicade drug level          Patient or patient representative verbalized consent for the use of Ambient Listening during the visit with  JANICE Hernadez for chart documentation. 3/17/2025  10:03 EDT    EMR Dragon/Transcription Disclaimer:  This document has been Dictated utilizing Dragon dictation.

## 2025-03-17 NOTE — PATIENT INSTRUCTIONS
History of gastritis, continue omeprazole 40 mg daily    Reviewed recent therapeutic drug monitoring, will increase infliximab infusions to 7.5 mg/kg and continue at 6-week interval.    Will work on referral to local dermatologist, Dr. Connie Jaffe for skin changes and to establish care    Please contact infusion representative regarding your concerns as we discussed during today's visit    Follow-up visit scheduled July 2025    Recommend therapeutic drug monitoring for Remicade after you have received your first dose of 7.5 mg/kg at 6-week interval May 19 or May 20 depending on timing of your next Remicade infusion, if there is a delay in your Remicade infusion and it is not exactly at 6-week interval or is not for higher dose of Remicade, please contact the office and we will postpone Remicade drug level

## 2025-03-20 ENCOUNTER — OFFICE VISIT (OUTPATIENT)
Dept: FAMILY MEDICINE CLINIC | Facility: CLINIC | Age: 45
End: 2025-03-20
Payer: COMMERCIAL

## 2025-03-20 VITALS
HEART RATE: 72 BPM | OXYGEN SATURATION: 100 % | HEIGHT: 65 IN | DIASTOLIC BLOOD PRESSURE: 68 MMHG | SYSTOLIC BLOOD PRESSURE: 118 MMHG | BODY MASS INDEX: 38.49 KG/M2 | RESPIRATION RATE: 16 BRPM | WEIGHT: 231 LBS | TEMPERATURE: 97.9 F

## 2025-03-20 DIAGNOSIS — L30.9 DERMATITIS: Primary | ICD-10-CM

## 2025-03-20 PROCEDURE — 99213 OFFICE O/P EST LOW 20 MIN: CPT | Performed by: NURSE PRACTITIONER

## 2025-03-20 RX ORDER — DOXYCYCLINE 100 MG/1
100 CAPSULE ORAL 2 TIMES DAILY
Qty: 20 CAPSULE | Refills: 0 | Status: SHIPPED | OUTPATIENT
Start: 2025-03-20

## 2025-03-20 NOTE — PROGRESS NOTES
"Chief Complaint  Ankle Pain (Patient states that she has some spots on her left ankle that have been bothering her for the past 6 weeks. )    Subjective        Sandra Pickard presents to [facility]  History of Present Illness  This is a 44-year-old female patient here today pain in her left ankle.  Patient reports she noticed a spot on her left ankle 6 weeks ago that was like a patchy area.  She did later noticed spots on her right leg.  She does report some mild itching.  She later noticed more redness and tenderness on her left ankle with darkness.  No reports of drainage.  No numbness or tingling.  She has been using Neosporin.    Objective   Vital Signs:  /68   Pulse 72   Temp 97.9 °F (36.6 °C) (Infrared)   Resp 16   Ht 165.1 cm (65\")   Wt 105 kg (231 lb)   SpO2 100%   BMI 38.44 kg/m²   Estimated body mass index is 38.44 kg/m² as calculated from the following:    Height as of this encounter: 165.1 cm (65\").    Weight as of this encounter: 105 kg (231 lb).          Physical Exam   Result Review :                Assessment and Plan   Diagnoses and all orders for this visit:    1. Dermatitis (Primary)    Other orders  -     doxycycline (VIBRAMYCIN) 100 MG capsule; Take 1 capsule by mouth 2 (Two) Times a Day.  Dispense: 20 capsule; Refill: 0      I am unsure of rash on bilateral legs.  Patient does have an appointment with dermatology next week.  Area on her ankle does look like a questionable insect/spider bite and I am concerned with cellulitis.  Patient was instructed to go ER if symptoms worsen.        Follow Up   No follow-ups on file.  Patient was given instructions and counseling regarding her condition or for health maintenance advice. Please see specific information pulled into the AVS if appropriate.             "

## 2025-04-09 ENCOUNTER — TELEPHONE (OUTPATIENT)
Dept: GASTROENTEROLOGY | Facility: CLINIC | Age: 45
End: 2025-04-09
Payer: COMMERCIAL

## 2025-04-09 NOTE — TELEPHONE ENCOUNTER
[facility] with [facility] re: Remicade infusion - said they received lab orders which included a UA, but they are unable to perform a UA there.

## 2025-04-10 NOTE — TELEPHONE ENCOUNTER
Notified Fran at [facility] that we did not order the labs.  They need to contact the physician who ordered the labs.  Pk/gunjan  Her dermatologist ordered the labs.  Connie Jaffe MD  pk   Pt arrived to floor after report from [facility] in PACU.    A/O x 4. Surgical site with 3 lap stabs to abdomen, orquidea, CDI. Abdomen soft, pain 3/10.     Pt ambulated from gurney to chair, then began to report lightheadedness.   Initial BP 67/27 while sitting.   Pt reclined in chair, LR reconnected and running to gravity while anesthesiologist  notified.   BP steadily increased with re-checks and back up to 113/61 five minutes later. Anesthesiologist from OR to pt room to check on pt. No new orders received d/t improvement in BP and decreased lightheadedness.    Call light within reach, rounding in place.

## 2025-06-25 ENCOUNTER — OFFICE VISIT (OUTPATIENT)
Dept: FAMILY MEDICINE CLINIC | Facility: CLINIC | Age: 45
End: 2025-06-25
Payer: COMMERCIAL

## 2025-06-25 VITALS
HEIGHT: 65 IN | BODY MASS INDEX: 38.44 KG/M2 | DIASTOLIC BLOOD PRESSURE: 76 MMHG | TEMPERATURE: 97.6 F | RESPIRATION RATE: 20 BRPM | SYSTOLIC BLOOD PRESSURE: 122 MMHG | OXYGEN SATURATION: 96 % | HEART RATE: 88 BPM

## 2025-06-25 DIAGNOSIS — Z12.31 ENCOUNTER FOR SCREENING MAMMOGRAM FOR MALIGNANT NEOPLASM OF BREAST: ICD-10-CM

## 2025-06-25 DIAGNOSIS — R31.9 HEMATURIA, UNSPECIFIED TYPE: ICD-10-CM

## 2025-06-25 DIAGNOSIS — K50.919 CROHN'S DISEASE WITH COMPLICATION, UNSPECIFIED GASTROINTESTINAL TRACT LOCATION: ICD-10-CM

## 2025-06-25 DIAGNOSIS — L30.9 ECZEMA, UNSPECIFIED TYPE: ICD-10-CM

## 2025-06-25 DIAGNOSIS — Z82.49 FAMILY HISTORY OF ARRHYTHMIA: ICD-10-CM

## 2025-06-25 DIAGNOSIS — E55.9 VITAMIN D DEFICIENCY: ICD-10-CM

## 2025-06-25 DIAGNOSIS — E16.2 HYPOGLYCEMIA: Primary | ICD-10-CM

## 2025-06-25 DIAGNOSIS — D64.9 ANEMIA, UNSPECIFIED TYPE: ICD-10-CM

## 2025-06-25 DIAGNOSIS — Z11.59 NEED FOR HEPATITIS C SCREENING TEST: ICD-10-CM

## 2025-06-25 DIAGNOSIS — D69.0 IGA MEDIATED LEUKOCYTOCLASTIC VASCULITIS: ICD-10-CM

## 2025-06-25 DIAGNOSIS — F43.23 ADJUSTMENT DISORDER WITH MIXED ANXIETY AND DEPRESSED MOOD: ICD-10-CM

## 2025-06-25 DIAGNOSIS — J35.1 ENLARGED TONSILS: ICD-10-CM

## 2025-06-25 DIAGNOSIS — E83.19 IRON EXCESS: ICD-10-CM

## 2025-06-25 PROBLEM — Z79.899 LONG TERM CURRENT USE OF THERAPEUTIC DRUG: Status: ACTIVE | Noted: 2025-04-18

## 2025-06-25 PROBLEM — Z87.19 HISTORY OF CROHN'S DISEASE: Status: ACTIVE | Noted: 2025-04-13

## 2025-06-25 PROBLEM — M31.0 HYPERSENSITIVITY ANGIITIS: Status: ACTIVE | Noted: 2025-03-27

## 2025-06-25 LAB
EXPIRATION DATE: NORMAL
INTERNAL CONTROL: NORMAL
Lab: NORMAL
S PYO AG THROAT QL: NEGATIVE

## 2025-06-25 RX ORDER — ESCITALOPRAM OXALATE 5 MG/1
5 TABLET ORAL DAILY
Qty: 45 TABLET | Refills: 0 | Status: SHIPPED | OUTPATIENT
Start: 2025-06-25

## 2025-06-25 RX ORDER — DAPSONE 25 MG/1
25 TABLET ORAL
COMMUNITY
Start: 2025-04-17

## 2025-06-25 RX ORDER — TRIAMCINOLONE ACETONIDE 1 MG/G
0.1 CREAM TOPICAL EVERY 12 HOURS SCHEDULED
COMMUNITY
Start: 2025-03-26

## 2025-06-25 RX ORDER — FAMOTIDINE 20 MG/1
TABLET, FILM COATED ORAL
COMMUNITY

## 2025-06-25 NOTE — PROGRESS NOTES
Subjective   Sandra Pickard is a 44 y.o. female who presents today for ear pain and overdue for follow up of moods with hypoglycemia, vitamin D deficiency, elevated iron, hematuria, elevated bilirubin, Crohn's disease with immunosuppression, psoriasis vs atopic dermatitis, suppurative hidradenitis, acne, and specialists.     HPI    Ear symptoms- not having pain but having crackling and feeling like will get ear infection. No ear infections until [place] pandemic. She reports taking Zyrtec and Flonase 2 sprays in each nostril once daily.   GERD- Omeprazole- told to take as needed- did not work that great. Using Pepcid if has symptoms- couple times weekly.     She has not been told that she has large tonsils prior.  She does snore-worse now but thinks she snores at other times as well.    Depression and anxiety- she stopped Wellbutrin because was not helping. She noticed no difference in moods. Still having symptoms intermittent. Lexapro- daughter is on medication and is working ok.   She had frequently with divorce but not since. Has been anxious a couple times but not until that point until recently.    2023- Panic attack a couple weeks prior-  and was at a game she was not playing at and son had to drive her home. She was shaky- thought she needed to eat, chest heaviness, felt weak and tired, felt cold.   Worry about kids with license and paying insurance, uncle  of pancreatic cancer yesterday. Mother with metastatic breast cancer. Mother seems ok- dx  with breast cancer and they did lumpectomy. 1 year later, they repeat scans and mets to lung- 5 L fluid on it. No longer needing to drain her lung but now in liver. On chemo and talking about changes. mothr with PalV2 mutation- patient has been checked and is negative. Sister and Aunt are positive. MGM negative.   Working at [employer] in the week and .   Daily not feeling depressed or anxious.   2024-she thought Wellbutrin  helped some- anxious more in the evening than in the day and felt like it was wearing off depending on the day. She continued with symptoms and felt like moods were still all over the place and no significant improvement other than maybe some improvement in anxiety.  Prior, she wanted to start Wellbutrin.    3/2024-she was taking both in the morning. She has no side effects. Some days- feels stomach being upset but is less severe and less often. No worsening.  Previous medications- Zoloft- made a shell of human being- no emotion- not able to cry or laugh. Wellbutrin- worked at the time but thinks she had to get through what she was going through. No AE.    Previous 279 and got down to 189 lbs. Weight Watchers. She has gained some. That helped symptoms. Thinks has gained 20 lbs.  Nonfasting.   Hypoglycemia- Eats breakfast- thinks had sausage biscuit and a coke before going to get labs and glucose was 64.   Has had low blood sugar a couple times with labs.   Never had elevated blood sugar. 6 weeks prior to labs in November was the 1st time she noticed.   Did not feel bad when she checked. No episodes of sweating, weakness, dizziness, change in mental status, or syncopal/ near syncopal episodes.   Vitamin D deficiency- not taking  She was taking vitamin D 5000 IU daily.   Anemia, elevated iron- not taking.   She thought she was better- no trouble that she is aware. Elevated iron 7/2023  Required transfusions [id number] when dx Crohn's.   Menses- getting worse- some months that she has to change every 15 minutes. On an average month- 1st or 2nd day, changes every few hours and is normal. Last about 4-5 days. The 1st severe period in 2020 and may have had 2-3 since then. They are over quickly though. She does not seem to have multiple days of that- either stops or very light. No further blood in stool.   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 Was not on menses then but is now.   Elevated  bilirubin- had been normal prior to labs 7/023 but elevated 7/2023.     Following with GI-last appointment 3/2025 for Crohn's disease, skin changes, enteropathic arthritis associated with Crohn's disease, history of erythema nodosum, Remicade long-term use, history of suppurative hidradenitis, chronic superficial gastritis without bleeding.  Crohn's disease-on Remicade.  She had not seen GI since 2020. Remicade every 6 weeks. Thinking she may need to change. Has to change because infusion nurse is leaving. They are likely setting    Chronic gastritis without bleeding-following with GI.  Cholelithiasis s/p cholecystectomy-   2020- found gallstones and referred to surgeon. She was seen and was not symptomatic. They advised she could wait. Then got sick in 11/2022. Thought she had stomach bug. She went to ER.   Patient admitted to the hospital 11/13/2022 through 11/14/2022 for lap ana for acute cholecystitis.  She was seen in follow-up 11/29/2022 and advised they would expect bloating to improve after a few weeks to a month and follow-up if no improvement or worsening.    FHx arrhythmia- Mother had to have pacemaker/ AICD- she was passing out and had times with very slow rate. Placed at 48-49 years of age.     Psoriasis versus atopic dermatitis- not trouble with it. She is ok.   She reports she has always had psoriasis. No trouble right now. At one point lost [place] of hair. She has arthritis issues- not sure the cause but thinks it was from Crohn's. It has not been main focus.   Suppurative hidradenitis- had really bad in axilla and had a couple in groin but not as bad. Remicade and topical treatment as needed and occasionally. Weight loss helped a lot.   Acne- had in the past but not now.     IGA Vasculitis- taking Dapsone for place that comes and goes on ankle. If forgets dose, immediately flares. Follow up next week. She has pain and swelling at the ankle.     Patient's specialists:  GI-Rona Amaya  JANICE Reich-last appointment 3/2025 for Crohn's disease, skin changes, enteropathic arthritis associated with Crohn's disease, history of erythema nodosum, Remicade long-term use, history of suppurative hidradenitis, chronic superficial gastritis without bleeding.  Therapeutic drug monitoring for Remicade at 6-week intervals, referral to local dermatologist-Dr. Connie Jaffe for skin changes.  Follow-up 7/2025.  Last colonoscopy 8/2024-large polyp rectosigmoid colon.  Ulcerated/eroded hyperplastic polyp with prominent pyogenic granuloma.  EGD 8/2024-normal.  Biopsies taken.  Moderate chronic inflammation.  Prior JANICE Cao- procedure visit 11/2022 for Remicade for Crohn's disease.  General surgery- Dr. Zari Belcher-last appointment 11/29/2022 for 4 week follow-up from cholecystectomy for cholecystitis.  Follow-up as needed.  [facility] Connie Jaffe-last appointment 4/2025 for leukocytoclastic vasculitis with palpable purpuric papules symmetrically distal right pretibial region and legs.  Treated with topical steroids-triamcinolone cream twice daily.  Order labs once biopsy results.  Follow-up 3 months.    Previous PCP- 2015- JANICE Dan-seen for possible UTI, ear and nose problems.  She went to a dermatologist for some skin issues and told she had psoriasis in her right ear canal.  Sore on the right side of her nose that she has been treating with Neosporin.  She was treated with Omnicef 300 mg twice daily for 10 days for otitis serous media with effusion and possible UTI.  Referred to ENT for further evaluation.  Follow-up as needed.  2014- seen for CPE with Pap.    The following portions of the patient's history were reviewed and updated as appropriate: allergies, current medications, past family history, past medical history, past social history, past surgical history and problem list.    Review of Systems    Objective   Vitals:    06/25/25 1130   BP: 122/76    Pulse: 88   Resp: 20   Temp: 97.6 °F (36.4 °C)   SpO2: 96%       Class 2 Severe Obesity (BMI >=35 and <=39.9). Obesity-related health conditions include the following: none. Obesity is worsening. BMI is is above average; BMI management plan is completed. We discussed portion control and increasing exercise.    Physical Exam  Constitutional:       General: She is not in acute distress.     Appearance: She is well-developed.   HENT:      Head: Normocephalic and atraumatic.   Eyes:      General:         Right eye: No discharge.         Left eye: No discharge.   Pulmonary:      Effort: Pulmonary effort is normal. No respiratory distress.   Skin:     General: Skin is dry.   Psychiatric:         Attention and Perception: She is attentive.         Mood and Affect: Mood normal.         Speech: Speech normal.         Behavior: Behavior normal.         Thought Content: Thought content normal.         Assessment & Plan   Diagnoses and all orders for this visit:    1. Hypoglycemia (Primary)    2. Need for hepatitis C screening test    3. Encounter for screening mammogram for malignant neoplasm of breast    4. Vitamin D deficiency    5. Iron excess    6. Anemia, unspecified type    7. Hematuria, unspecified type    8. Crohn's disease with complication, unspecified gastrointestinal tract location    9. Family history of arrhythmia    10. Eczema, unspecified type    11. IgA mediated leukocytoclastic vasculitis    12. Adjustment disorder with mixed anxiety and depressed mood  -     escitalopram (Lexapro) 5 MG tablet; Take 1 tablet by mouth Daily. May increase to 2 tablets in 2 weeks if needed.  Dispense: 45 tablet; Refill: 0    13. Enlarged tonsils  -     POC Rapid Strep A  -     Throat / Upper Respiratory Culture - Swab, Throat          Assessment and Plan  Follow up in 1 month for reevaluation of moods-she will need fasting labs as well.    Eustachian tube dysfunction, PND, GERD- Etiology of symptoms is unclear-it could be  allergic or GERD related.  I advised she continue Zyrtec daily and ensure she is using Flonase properly.  If persistent symptoms, can change to Nasacort 2 sprays each nostril once daily.  Also asked that she take Pepcid 20 mg twice daily every day.  If no improvement, worsening, new or changing symptoms, we will get her to ENT and allergist to see if she needs further testing or treatment.  Of note, she does have enlarged tonsils today and reports this has not been told to her in the past.  We will check throat culture with negative strep test.  If negative, we will reevaluate snoring at her follow-up and consider sleep study or ENT for evaluation.  Adjustment reaction with depression and anxiety- Patient with worsening moods. I restarted Wellbutrin  mg daily, however, she only had minimal improvement with wearing off in the evening. She increased Wellbutrin XL to 300 mg once daily and reported significant improvement in moods.  She reported she may have some days that she has more GI symptoms or mood symptoms that are more mild and less frequent.  She overall felt she was on the correct dose.  Previously advised continue Wellbutrin  mg daily.  If AE with medication or no improvement, I advised we could decrease back to 150 mg once daily and add Lexapro 5 mg daily.  She was to follow-up in July 2024 for reevaluation of moods but did not follow-up.  She stopped Wellbutrin and did not follow-up for mood symptoms.  However, she has continued with mood symptoms.  Her daughter has done well on Lexapro.  I will have her try Lexapro 5 mg daily and can increase to 10 mg daily if no side effects but no improvement in moods in 2 weeks.  Follow-up in 1 month for reevaluation of moods.  To be seen sooner if worsening moods or AE with medication.  Hypoglycemia- Patient should avoid refined sugars- stop sodas, juices, sweet tea, gatorade, sugary drinks, alcohol, sweets, sugars, and decrease starches- pasta, potatoes,  bread.  I will recheck at follow-up.  Vitamin D deficiency- Vitamin D was ok with last labs on vitamin D 5000 IU daily.  She is not currently on vitamin D.  Recommendations pending labs at follow-up  Anemia, elevated iron- Iron was ok with last labs. I will continue to monitor and make recommendations.   Hematuria- She reports blood in urine when not on menses but on menses now. We will need to re-evaluate for blood when not on menses and make further recommendations pending recheck.   Elevated bilirubin- I will recheck and make further recommendations.   Crohn's disease- She should continue follow-up and treatment as directed by GI.     Cholelithiasis s/p cholecystectomy- If persistently elevated bilirubin, we will consider repeat imaging. She should be seen if any GI symptoms.    FHx arrhythmia- We will need to consider cardiology workup to ensure symptoms of panic attack are not from arrhythmia if patient has any recurrence of symptoms.   Psoriasis versus atopic dermatitis- Patient will need to see dermatology if worsening, new, or changing symptoms or uncontrolled dermatitis.   Suppurative hidradenitis- She has had improvement with weight loss and Remicade.  Continue follow-up with dermatology.   Acne- Acne improved. To see dermatology if uncontrolled.   IgA mediated leukocytoclastic vasculitis- Continue treatment and follow-up per dermatology.    I spent 30 minutes caring for Sandra Pickard on this date of service. This time includes time spent by me in the following activities as necessary: preparing for the visit, reviewing tests, specialists records and previous visits, obtaining and/or reviewing a separately obtained history, performing a medically appropriate exam and/or evaluation, counseling and educating the patient, family, caregiver, referring and/or communicating with other healthcare professionals, documenting information in the medical record, independently interpreting results and communicating  that information with the patient, family, caregiver, and developing a medically appropriate treatment plan with consideration of other conditions, medications, and treatments.

## 2025-06-28 LAB
BACTERIA SPEC RESP CULT: NORMAL
BACTERIA SPEC RESP CULT: NORMAL

## 2025-06-30 ENCOUNTER — RESULTS FOLLOW-UP (OUTPATIENT)
Dept: FAMILY MEDICINE CLINIC | Facility: CLINIC | Age: 45
End: 2025-06-30
Payer: COMMERCIAL

## 2025-07-01 NOTE — TELEPHONE ENCOUNTER
Left message to call OK FOR [facility]UB TO RELAY    Negative throat culture.  Please see how patient is feeling.

## 2025-07-01 NOTE — TELEPHONE ENCOUNTER
Name: Sandra Pickard      Relationship: Self      Best Callback Number: [phone]      HUB PROVIDED THE RELAY MESSAGE FROM THE OFFICE      PATIENT: VOICED UNDERSTANDING AND HAS NO FURTHER QUESTIONS AT THIS TIME    ADDITIONAL INFORMATION:  PATIENT STATES SHE IS FEELING A TAD BIT BETTER.    EARS ARE STILL BOTHERING HER.

## 2025-07-08 ENCOUNTER — OFFICE VISIT (OUTPATIENT)
Dept: GASTROENTEROLOGY | Facility: CLINIC | Age: 45
End: 2025-07-08
Payer: COMMERCIAL

## 2025-07-08 VITALS
HEART RATE: 66 BPM | TEMPERATURE: 97.5 F | DIASTOLIC BLOOD PRESSURE: 84 MMHG | HEIGHT: 65 IN | BODY MASS INDEX: 38 KG/M2 | SYSTOLIC BLOOD PRESSURE: 122 MMHG | OXYGEN SATURATION: 97 % | WEIGHT: 228.1 LBS

## 2025-07-08 DIAGNOSIS — K50.90 ENTEROPATHIC ARTHRITIS ASSOCIATED WITH CROHN'S DISEASE: ICD-10-CM

## 2025-07-08 DIAGNOSIS — M31.0 LEUKOCYTOCLASTIC VASCULITIS: ICD-10-CM

## 2025-07-08 DIAGNOSIS — Z87.2 HISTORY OF PYODERMA GANGRENOSUM: ICD-10-CM

## 2025-07-08 DIAGNOSIS — K29.60 EROSIVE GASTRITIS: ICD-10-CM

## 2025-07-08 DIAGNOSIS — K50.018 CROHN'S DISEASE OF ILEUM WITH OTHER COMPLICATION: Primary | ICD-10-CM

## 2025-07-08 DIAGNOSIS — M07.60 ENTEROPATHIC ARTHRITIS ASSOCIATED WITH CROHN'S DISEASE: ICD-10-CM

## 2025-07-08 DIAGNOSIS — Z79.620 INFLIXIMAB (REMICADE) LONG-TERM USE: ICD-10-CM

## 2025-07-08 PROCEDURE — 99214 OFFICE O/P EST MOD 30 MIN: CPT | Performed by: NURSE PRACTITIONER

## 2025-07-08 RX ORDER — VONOPRAZAN FUMARATE 13.36 MG/1
10 TABLET ORAL DAILY
Qty: 30 TABLET | Refills: 0 | COMMUNITY
Start: 2025-07-08

## 2025-07-08 NOTE — PROGRESS NOTES
Chief Complaint   Patient presents with    Crohn's Disease     On infliximab, dosage increase, abdominal pain           GASTROENTEROLOGY SUMMARY     Last visit 3/6/2025.     Initial consult April 15, 2024 for Crohn's disease.  She was initially diagnosed with Crohn's ileitis in 2012.  She has a history of erythema nodosum, joint pain and hidradenitis suppurativa.  No history of bowel surgery or resection.  She is status postcholecystectomy November 2022.    Last EGD and colonoscopy August 26, 2024  Colonoscopy with endoscopic and histologic quiescent Crohn's disease and hyperplastic polyp.  EGD with moderate gastritis, no H. pylori, normal small bowel biopsies.     No family history of colon cancer, polyps or inflammatory bowel disease.     Previous treatments for Crohn's disease:   Imuran  Prednisone.  Infliximab, initiated 2013-currently on infliximab 5 mg/kg every 6 weeks  She is premedicated with Tylenol 650 mg and Claritin 10 mg   2/17/2025 TDM: Infliximab serum concentration 5.6, antibody formation 72.     History of Present Illness  The patient is a 45-year-old female who presents today for a follow-up.    Vasculitis  She follows with dermatologist Dr. Connie Jaffe for diagnosis of leukocytoclastic vasculitis.  She reports an improvement in her vasculitis, although she still experiences occasional flare-ups at the site of the original biopsy (right distal pretibial region).   She has been on dapsone since 04/2025 and tolerates it well without any side effects.   Her next appointment with Dr. Jaffe is scheduled for 07/31/2025.  - Onset: Improvement noted, occasional flare-ups at the site of the original biopsy.  - Duration: On dapsone since 04/2025.  - Character: Occasional flare-ups.  - Alleviating Factors: Dapsone.  - Severity: Tolerates dapsone well without any side effects.    Abdominal Condition  Her abdominal condition remains stable, with no significant changes.   She describes her symptoms as  unpredictable, with certain foods causing discomfort one day but not the next.   For instance, she recalls feeling unwell after consuming crackers, which was the only food she had that day.   She also mentions that grilled chicken sometimes causes discomfort.   Her symptoms typically include urgency and frequency of bowel movements, which are usually resolved after one episode.   She experiences mild cramping but no severe pain.   On average, she has 3 to 4 bowel movements per day, with no specific pattern related to meals.   She received her last Remicade infusion on 07/01/2025 and is due for her next one on 08/12/2025.   She often chooses to skip meals to avoid stomachaches.   She describes her discomfort as a sensation of her gut turning and aching, and she often feels unwell after eating.   She has not tried dicyclomine for her stomachaches.   - Onset: Unpredictable symptoms with certain foods causing discomfort.  - Location: Abdominal area.  - Character: Urgency and frequency of bowel movements, mild cramping, sensation of gut turning and aching.  - Alleviating/Aggravating Factors: Certain foods like crackers and grilled chicken; skipping meals to avoid stomachaches.  - Timing: Symptoms typically resolved after one episode; 3 to 4 bowel movements per day.  - Severity: Mild cramping, no severe pain; often feels unwell after eating.    PAST SURGICAL HISTORY:  Gallbladder removal    SOCIAL HISTORY  Occupation: Teacher     Result Review :     Blood work 4/9/2025:  ANCA screen: Negative  Myeloperoxidase Ab: Not detected  Proteinase-3 Ab: Not detected  Glucose 6 phosphate dehydrogenase, QN: 18.5, normal  Complement Comp C3 + C4: 131, normal  JHON: Positive, 1:1280, nuclear, dense fine speckled pattern  Rheumatoid factor: Normal  CCP Ab IgG: Negative    LABS SCANNED (04/15/2025)  REVIEWED PERTINENT RESULTS/ LABS  Lab Results   Component Value Date    CASEREPORT  08/26/2024     Surgical Pathology Report                          Case: [id number]                                  Authorizing Provider:  Sourav Cuevas MD      Collected:           08/26/2024 12:27 PM          Ordering Location:     [facility]     Received:            08/26/2024 01:53 PM                                 [facility] MAIN OR                                                     Pathologist:           Josh Weaver MD                                                         Specimens:   1) - Small Intestine, Duodenum Biopsy                                                               2) - Gastric, Antrum, Antrum Biopsy                                                                 3) - Small Intestine, terminal Ileum Biopsy                                                         4) - Large Intestine, Right / Ascending Colon, Right Colon Biopsy                                   5) - Large Intestine, Transverse Colon, Transverse Colon Biopsy                                     6) - Large Intestine, Left / Descending Colon, Left Colon Biopsy                                    7) - Large Intestine, Rectum, Rectal=Sigmoid Polyp                                                  8) - Large Intestine, Rectum, rectal Polyp                                                 FINALDX  08/26/2024     1.  Duodenal biopsy: Duodenal mucosa with  A. Normal villous architecture with minimally increased intraepithelial lymphocytes..  B. No active inflammation, granulomatous inflammation nor intestinal parasites identified by routine staining.    2.  Gastric antrum biopsy: Gastric mucosa with   A.  Moderate chronic inflammation.   B.  No H. pylori organisms identified by routine staining.   C.  Reactive change noted, no dysplasia nor malignancy identified.    3.  Terminal ileum biopsy: Small intestine mucosa with  A. Normal villous architecture.  B. No active inflammation, granulomatous inflammation nor intestinal parasites identified by routine  "staining.    4.  Right ascending colon biopsy: Colonic mucosa with   A.  Normal crypt architecture.   B.  No active inflammation nor granulomatous inflammation identified.    5.  Transverse colon biopsy: Colonic mucosa with   A.  Normal crypt architecture.   B.  No active inflammation nor granulomatous inflammation identified.    6.  Left descending colon biopsy: Colonic mucosa with   A.  Normal crypt architecture.   B.  No active inflammation nor granulomatous inflammation identified    7.  Rectosigmoid colon polyp biopsy:   A.  Ulcerated/eroded hyperplastic polyp with prominent pyogenic granuloma                      (Inflamed granulation tissue) with reactive stromal atypia (see comment).   B.  No definitive epithelial dysplasia nor malignancy identified.    8.  Rectal polyp biopsy:  A. Polypoid colonic mucosa consistent with hyperplastic polyp.  B. No glandular dysplasia nor malignancy identified.         Lab Results   Component Value Date    HGB 13.5 02/17/2025    MCV 86.1 02/17/2025     02/17/2025    ALT 16 02/17/2025    AST 17 02/17/2025    HGBA1C 5.1 12/15/2023    TRIG 86 07/18/2023    FERRITIN 21 12/15/2023    TIBC 303 12/15/2023       Vital Signs:   /84 (BP Location: Left arm, Patient Position: Sitting, Cuff Size: Large Adult)   Pulse 66   Temp 97.5 °F (36.4 °C)   Ht 165.1 cm (65\")   Wt 103 kg (228 lb 1.6 oz)   SpO2 97%   BMI 37.96 kg/m²     Body mass index is 37.96 kg/m².     Physical Exam  Vitals reviewed.   Constitutional:       General: She is not in acute distress.     Appearance: Normal appearance. She is not ill-appearing or toxic-appearing.   Eyes:      General: No scleral icterus.  Pulmonary:      Effort: Pulmonary effort is normal. No respiratory distress.   Skin:     Coloration: Skin is not jaundiced.      Comments: Skin changes from previous biopsy site right distal tibia, no drainage, erythema or swelling   Neurological:      Mental Status: She is alert and oriented to " person, place, and time.   Psychiatric:         Mood and Affect: Mood normal.         Behavior: Behavior normal.         Thought Content: Thought content normal.         Judgment: Judgment normal.         Assessment and Plan        Diagnoses and all orders for this visit:    1. Crohn's disease of ileum with other complication (Primary)  -     Infliximab and Anti-Infliximab AB DoseASSURE IFX    2. Enteropathic arthritis associated with Crohn's disease  -     Infliximab and Anti-Infliximab AB DoseASSURE IFX    3. Infliximab (Remicade) long-term use  -     Infliximab and Anti-Infliximab AB DoseASSURE IFX    4. Erosive gastritis  -     Vonoprazan Fumarate (Voquezna) 10 MG tablet; Take 1 tablet by mouth Daily.  Dispense: 30 tablet; Refill: 0    5. History of pyoderma gangrenosum    6. Leukocytoclastic vasculitis       Assessment & Plan  1. Crohn's ileitis diagnosed in 2012 with history of extraintestinal manifestations including erythema nodosum, joint pain and new bilateral lower extremity skin lesions  - At her last visit she was having worsening joint pain and fatigue prior to her infliximab infusions at 5 mg/kg every 6 weeks  - Infliximab TDM 2/17/2025: sub-optimal serum concentration - 5.6, antibody 72  infliximab continued at 6-week interval but dose was increased from 5 mg/kg to 7.5 mg/kg every 6 weeks-she has had 2 infusions at the higher dose  TDM goal: Maintain Remicade level of 7.5 or higher  -Most recent infliximab infusion 7/1/2025  -She is due for her next infliximab infusion on 08/12/2025.     -Orders placed for repeat infliximab TDM, blood work to be conducted prior to the third infusion at 7.5 mg/kg every 6 weeks scheduled for 08/12/2025.    -A repeat colonoscopy is  recommended August 2027 unless symptoms change or condition warrants and this needs to be performed sooner    2. Abdominal issues:  - Experiences unpredictable gastrointestinal symptoms, including urgency and increased frequency of bowel  movements.  - Discussed possible causes, symptoms may be related to diet and absence of gallbladder.  - Advised to avoid extreme dietary habits and maintain a balanced diet to manage symptoms better.  -To consider trial of dicyclomine  - Also to consider Xifaxan  - Also to consider bacterial overgrowth testing    3.  Gastritis and duodenitis on EGD 8/2024  - EGD 8/26/2024 with mild erosions in the duodenum and moderate gastritis without H. pylori, she completed 12 weeks of omeprazole and this was discontinued January 2025  -Samples of Voquenza 10 mg once daily with or without food provided to see if this provides any improvement in symptoms, if it does, we can send a prescription to pharmacy    4.  Leukocytoclastic vasculitis:  - Significant improvement noted with dapsone since 04/2025.  - No reported side effects from dapsone.  - Biopsy site from 03/27/2025 still exhibits some redness but is healing.  - Continue dapsone as prescribed.  - Follow-up appointment with Dr. Jaffe on 07/31/2025.    Additional recommendations will be made based on repeat infliximab drug level  Follow-up scheduled November 6, 2025 for continued monitoring       Will fax a copy of today's visit to her dermatologist Dr. Connie Jaffe        Patient Instructions   Treatment Plan:  Continue taking dapsone for vasculitis as prescribed.  Continue Remicade (infliximab) 7.5 mg/kg every 6 weeks for Crohn's disease, joint pain  Blood work to be conducted prior to the third infusion at the increased dosage of 7.5 mg/kg which is scheduled for 08/12/2025.  Maintain a balanced diet and avoid extreme dietary habits to manage abdominal symptoms.  Start Voquenza 10 mg 1 tablet by mouth daily, samples provided to see if this provides improvement in symptoms, EGD 8/8/2024 with gastritis and duodenitis     Follow-Up Instructions:  Continue following with dermatology, has scheduled follow-up appointment with Dr. Jaffe on 07/31/2025.  Recommend repeat infliximab  level prior to the third infusion at higher dose of 7.5 mg/kg every 6 weeks, she is due for her third infusion at the higher dose 8/12/2025   Avoid extreme dietary habits and maintain a balanced diet.  Based on clinical course and response to Voquenza, consider trying dicyclomine for stomachaches.     Additional Notes:  Sandra often skips meals to avoid stomachaches, we will see if this improves with Voquenza      Patient or patient representative verbalized consent for the use of Ambient Listening during the visit with  JANICE Hernadez for chart documentation. 7/17/2025  07:11 EDT    EMR Dragon/Transcription Disclaimer:  This document has been Dictated utilizing Dragon dictation.

## 2025-07-17 NOTE — PATIENT INSTRUCTIONS
Treatment Plan:  Continue taking dapsone for vasculitis as prescribed.  Continue Remicade (infliximab) 7.5 mg/kg every 6 weeks for Crohn's disease, joint pain  Blood work to be conducted prior to the third infusion at the increased dosage of 7.5 mg/kg which is scheduled for 08/12/2025.  Maintain a balanced diet and avoid extreme dietary habits to manage abdominal symptoms.  Start Voquenza 10 mg 1 tablet by mouth daily, samples provided to see if this provides improvement in symptoms, EGD 8/8/2024 with gastritis and duodenitis     Follow-Up Instructions:  Continue following with dermatology, has scheduled follow-up appointment with Dr. Jaffe on 07/31/2025.  Recommend repeat infliximab level prior to the third infusion at higher dose of 7.5 mg/kg every 6 weeks, she is due for her third infusion at the higher dose 8/12/2025   Avoid extreme dietary habits and maintain a balanced diet.  Based on clinical course and response to Voquenza, consider trying dicyclomine for stomachaches.     Additional Notes:  Sandra often skips meals to avoid stomachaches, we will see if this improves with Voquenza

## 2025-07-28 DIAGNOSIS — F43.23 ADJUSTMENT DISORDER WITH MIXED ANXIETY AND DEPRESSED MOOD: ICD-10-CM

## 2025-07-28 RX ORDER — ESCITALOPRAM OXALATE 5 MG/1
5 TABLET ORAL DAILY
Qty: 45 TABLET | Refills: 0 | Status: SHIPPED | OUTPATIENT
Start: 2025-07-28 | End: 2025-07-29 | Stop reason: SDUPTHER

## 2025-07-28 NOTE — TELEPHONE ENCOUNTER
Caller: Sandra Pickard YULISSA    Relationship: Self    Best call back number: [phone]     Requested Prescriptions:   Requested Prescriptions     Pending Prescriptions Disp Refills    escitalopram (Lexapro) 5 MG tablet 45 tablet 0     Sig: Take 1 tablet by mouth Daily. May increase to 2 tablets in 2 weeks if needed.        Pharmacy where request should be sent: [facility] [id number] [address] AT [facility] [phone] [facility] [phone] FX     Last office visit with prescribing clinician: 6/25/2025   Last telemedicine visit with prescribing clinician: Visit date not found   Next office visit with prescribing clinician: Visit date not found     Additional details provided by patient: PATIENT IS OUT OF THIS MEDICATION AND IS CURRENTLY OUT OF TOWN.     PATIENT STATES THAT SHE IS SCHEDULING AN APPOINTMENT TO COME IN AS WELL SO IF ONLY ENOUGH CAN BE SENT IN FOR HER TO BE SEEN THAT IS OKAY.     Does the patient have less than a 3 day supply:  [x] Yes  [] No    Would you like a call back once the refill request has been completed: [] Yes [x] No    If the office needs to give you a call back, can they leave a voicemail: [] Yes [x] No    Preet Stone Rep   07/28/25 11:26 EDT

## 2025-07-29 RX ORDER — ESCITALOPRAM OXALATE 10 MG/1
10 TABLET ORAL DAILY
Qty: 90 TABLET | Refills: 0 | Status: SHIPPED | OUTPATIENT
Start: 2025-07-29

## 2025-07-29 NOTE — TELEPHONE ENCOUNTER
Insurance will not cover 5 mg with two tablets a day. Verified with patient she is taking 10 mg. Please send in 10 mg tablet instead of 2 of the 5 mg

## 2025-07-30 PROBLEM — F43.23 ADJUSTMENT DISORDER WITH MIXED ANXIETY AND DEPRESSED MOOD: Status: ACTIVE | Noted: 2025-07-30

## 2025-07-30 PROBLEM — J35.1 ENLARGED TONSILS: Status: ACTIVE | Noted: 2025-07-30

## 2025-07-30 PROBLEM — E16.2 HYPOGLYCEMIA: Status: ACTIVE | Noted: 2025-07-30

## 2025-07-30 PROBLEM — D69.0 IGA MEDIATED LEUKOCYTOCLASTIC VASCULITIS: Status: ACTIVE | Noted: 2025-07-30

## 2025-07-30 PROBLEM — R31.9 HEMATURIA: Status: ACTIVE | Noted: 2025-07-30

## 2025-07-30 PROBLEM — E83.19 IRON EXCESS: Status: ACTIVE | Noted: 2025-07-30

## 2025-07-30 PROBLEM — Z82.49 FAMILY HISTORY OF ARRHYTHMIA: Status: ACTIVE | Noted: 2025-07-30

## 2025-08-18 LAB
INFLIXIMAB AB SERPL-MCNC: 60 NG/ML
INFLIXIMAB SERPL-MCNC: 17 UG/ML

## (undated) DEVICE — ENDOPATH XCEL BLADELESS TROCARS WITH STABILITY SLEEVES: Brand: ENDOPATH XCEL

## (undated) DEVICE — LAPAROVUE VISIBILITY SYSTEM LAPAROSCOPIC SOLUTIONS: Brand: LAPAROVUE

## (undated) DEVICE — SUT MNCRYL PLS ANTIB UD 4/0 PS2 18IN

## (undated) DEVICE — DRAPE,REIN 53X77,STERILE: Brand: MEDLINE

## (undated) DEVICE — MSK ENDO PORT O2 POM ELITE CURAPLEX A/

## (undated) DEVICE — ENDOPOUCH RETRIEVER SPECIMEN RETRIEVAL BAGS: Brand: ENDOPOUCH RETRIEVER

## (undated) DEVICE — GOWN ISOL W/THUMB UNIV BLU BX/15

## (undated) DEVICE — ADAPT CLN SCPE ENDO PORPOISE BX/50 DISP

## (undated) DEVICE — SUT VIC 0/0 UR6 27IN DYED J603H

## (undated) DEVICE — ENDOCUT SCISSOR TIP, DISPOSABLE: Brand: RENEW

## (undated) DEVICE — DISPOSABLE MONOPOLAR ENDOSCOPIC CORD 10 FT. (3M): Brand: KIRWAN

## (undated) DEVICE — CATH IV INSYTE AUTOGARD 14G 1 1/2IN ORNG

## (undated) DEVICE — CONTAINER,SPECIMEN,OR STERILE,4OZ: Brand: MEDLINE

## (undated) DEVICE — STPCK 3WY D201 DISCOFIX

## (undated) DEVICE — APPL CHLORAPREP HI/LITE 26ML ORNG

## (undated) DEVICE — SUREFIT, DUAL DISPERSIVE ELECTRODE, CONTACT QUALITY MONITOR: Brand: SUREFIT

## (undated) DEVICE — DRP C/ARM 41X74IN

## (undated) DEVICE — VIAL FORMLN CAP 10PCT 20ML

## (undated) DEVICE — ENDOPATH PNEUMONEEDLE INSUFFLATION NEEDLES WITH LUER LOCK CONNECTORS 120MM: Brand: ENDOPATH

## (undated) DEVICE — LOU LAP CHOLE: Brand: MEDLINE INDUSTRIES, INC.

## (undated) DEVICE — SINGLE-USE BIOPSY FORCEPS: Brand: RADIAL JAW 4

## (undated) DEVICE — CATH CHOLANG 4.5F18IN BRGNDY

## (undated) DEVICE — SYRINGE, LUER SLIP, STERILE, 60ML: Brand: MEDLINE

## (undated) DEVICE — ADHS SKIN SURG TISS VISC PREMIERPRO EXOFIN HI/VISC FAST/DRY

## (undated) DEVICE — CANN O2 ETCO2 FITS ALL CONN CO2 SMPL A/ 7IN DISP LF

## (undated) DEVICE — KT ORCA ORCAPOD DISP STRL

## (undated) DEVICE — Device

## (undated) DEVICE — BLCK/BITE BLOX W/DENTL/RIM W/STRAP 54F

## (undated) DEVICE — ENDOPATH XCEL UNIVERSAL TROCAR STABLILITY SLEEVES: Brand: ENDOPATH XCEL

## (undated) DEVICE — SOL NACL 0.9PCT 1000ML

## (undated) DEVICE — FLEX ADVANTAGE 1500CC: Brand: FLEX ADVANTAGE

## (undated) DEVICE — THE SINGLE USE ETRAP – POLYP TRAP IS USED FOR SUCTION RETRIEVAL OF ENDOSCOPICALLY REMOVED POLYPS.: Brand: ETRAP

## (undated) DEVICE — SINGLE-USE POLYPECTOMY SNARE: Brand: SENSATION SHORT THROW

## (undated) DEVICE — EXTENSION SET, MALE LUER LOCK ADAPTER WITH RETRACTABLE COLLAR

## (undated) DEVICE — LAPAROSCOPIC SMOKE FILTRATION SYSTEM: Brand: PALL LAPAROSHIELD® PLUS LAPAROSCOPIC SMOKE FILTRATION SYSTEM

## (undated) DEVICE — PATIENT RETURN ELECTRODE, SINGLE-USE, CONTACT QUALITY MONITORING, ADULT, WITH 9FT CORD, FOR PATIENTS WEIGING OVER 33LBS. (15KG): Brand: MEGADYNE

## (undated) DEVICE — GLV SURG BIOGEL LTX PF 6 1/2

## (undated) DEVICE — GOWN PROC ENDOARMOR GI LVL3 HY/SHLD UNIV